# Patient Record
Sex: MALE | Race: WHITE | Employment: OTHER | URBAN - NONMETROPOLITAN AREA
[De-identification: names, ages, dates, MRNs, and addresses within clinical notes are randomized per-mention and may not be internally consistent; named-entity substitution may affect disease eponyms.]

---

## 2017-01-24 ENCOUNTER — CARE COORDINATION (OUTPATIENT)
Dept: CARE COORDINATION | Age: 82
End: 2017-01-24

## 2017-02-07 ENCOUNTER — TELEPHONE (OUTPATIENT)
Dept: FAMILY MEDICINE CLINIC | Age: 82
End: 2017-02-07

## 2017-02-07 DIAGNOSIS — I82.A11 DVT OF AXILLARY VEIN, ACUTE RIGHT (HCC): Primary | ICD-10-CM

## 2017-02-07 DIAGNOSIS — Z79.01 ANTICOAGULANT LONG-TERM USE: ICD-10-CM

## 2017-02-08 ENCOUNTER — TELEPHONE (OUTPATIENT)
Dept: FAMILY MEDICINE CLINIC | Age: 82
End: 2017-02-08

## 2017-02-08 RX ORDER — WARFARIN SODIUM 3 MG/1
3 TABLET ORAL DAILY
Qty: 90 TABLET | Refills: 1 | Status: SHIPPED | OUTPATIENT
Start: 2017-02-08 | End: 2017-04-24 | Stop reason: SDUPTHER

## 2017-02-22 ENCOUNTER — CARE COORDINATION (OUTPATIENT)
Dept: CARE COORDINATION | Age: 82
End: 2017-02-22

## 2017-03-13 ENCOUNTER — PROCEDURE VISIT (OUTPATIENT)
Dept: CARDIOLOGY | Age: 82
End: 2017-03-13

## 2017-03-13 ENCOUNTER — OFFICE VISIT (OUTPATIENT)
Dept: CARDIOLOGY | Age: 82
End: 2017-03-13

## 2017-03-13 VITALS — DIASTOLIC BLOOD PRESSURE: 60 MMHG | HEIGHT: 63 IN | HEART RATE: 60 BPM | SYSTOLIC BLOOD PRESSURE: 126 MMHG

## 2017-03-13 DIAGNOSIS — E78.01 FAMILIAL HYPERCHOLESTEROLEMIA: ICD-10-CM

## 2017-03-13 DIAGNOSIS — I10 ESSENTIAL HYPERTENSION: Primary | ICD-10-CM

## 2017-03-13 DIAGNOSIS — I25.10 CORONARY ARTERY DISEASE INVOLVING NATIVE CORONARY ARTERY OF NATIVE HEART WITHOUT ANGINA PECTORIS: ICD-10-CM

## 2017-03-13 DIAGNOSIS — Z95.0 PACEMAKER: Primary | ICD-10-CM

## 2017-03-13 DIAGNOSIS — E78.5 DYSLIPIDEMIA: ICD-10-CM

## 2017-03-13 DIAGNOSIS — Z95.0 PACEMAKER: ICD-10-CM

## 2017-03-13 DIAGNOSIS — I50.30 CHF WITH LEFT VENTRICULAR DIASTOLIC DYSFUNCTION, NYHA CLASS 2 (HCC): ICD-10-CM

## 2017-03-13 DIAGNOSIS — R06.02 SOB (SHORTNESS OF BREATH): ICD-10-CM

## 2017-03-13 PROCEDURE — 1123F ACP DISCUSS/DSCN MKR DOCD: CPT | Performed by: NUCLEAR MEDICINE

## 2017-03-13 PROCEDURE — 1036F TOBACCO NON-USER: CPT | Performed by: NUCLEAR MEDICINE

## 2017-03-13 PROCEDURE — 4040F PNEUMOC VAC/ADMIN/RCVD: CPT | Performed by: NUCLEAR MEDICINE

## 2017-03-13 PROCEDURE — G8484 FLU IMMUNIZE NO ADMIN: HCPCS | Performed by: NUCLEAR MEDICINE

## 2017-03-13 PROCEDURE — 99213 OFFICE O/P EST LOW 20 MIN: CPT | Performed by: NUCLEAR MEDICINE

## 2017-03-13 PROCEDURE — 93280 PM DEVICE PROGR EVAL DUAL: CPT | Performed by: INTERNAL MEDICINE

## 2017-03-13 PROCEDURE — G8598 ASA/ANTIPLAT THER USED: HCPCS | Performed by: NUCLEAR MEDICINE

## 2017-03-13 PROCEDURE — G8427 DOCREV CUR MEDS BY ELIG CLIN: HCPCS | Performed by: NUCLEAR MEDICINE

## 2017-03-13 PROCEDURE — G8420 CALC BMI NORM PARAMETERS: HCPCS | Performed by: NUCLEAR MEDICINE

## 2017-03-13 RX ORDER — FUROSEMIDE 40 MG/1
40 TABLET ORAL DAILY
Qty: 90 TABLET | Refills: 3 | Status: SHIPPED | OUTPATIENT
Start: 2017-03-13 | End: 2017-10-30 | Stop reason: SDUPTHER

## 2017-03-13 RX ORDER — SIMVASTATIN 80 MG
80 TABLET ORAL NIGHTLY
Qty: 30 TABLET | Refills: 11 | Status: SHIPPED | OUTPATIENT
Start: 2017-03-13 | End: 2017-10-30 | Stop reason: SDUPTHER

## 2017-03-13 RX ORDER — ISOSORBIDE DINITRATE 20 MG/1
20 TABLET ORAL 3 TIMES DAILY
Qty: 90 TABLET | Refills: 11 | Status: SHIPPED | OUTPATIENT
Start: 2017-03-13 | End: 2017-10-30 | Stop reason: SDUPTHER

## 2017-03-13 RX ORDER — POTASSIUM CHLORIDE 600 MG/1
8 TABLET, FILM COATED, EXTENDED RELEASE ORAL 2 TIMES DAILY
Qty: 180 TABLET | Refills: 3 | Status: SHIPPED | OUTPATIENT
Start: 2017-03-13 | End: 2017-04-24 | Stop reason: SDUPTHER

## 2017-03-23 ENCOUNTER — CARE COORDINATION (OUTPATIENT)
Dept: CARE COORDINATION | Age: 82
End: 2017-03-23

## 2017-04-24 ENCOUNTER — CARE COORDINATION (OUTPATIENT)
Dept: CARE COORDINATION | Age: 82
End: 2017-04-24

## 2017-04-24 ENCOUNTER — OFFICE VISIT (OUTPATIENT)
Dept: FAMILY MEDICINE CLINIC | Age: 82
End: 2017-04-24

## 2017-04-24 VITALS
BODY MASS INDEX: 26.68 KG/M2 | WEIGHT: 150.6 LBS | SYSTOLIC BLOOD PRESSURE: 118 MMHG | HEIGHT: 63 IN | DIASTOLIC BLOOD PRESSURE: 60 MMHG | HEART RATE: 64 BPM

## 2017-04-24 DIAGNOSIS — E78.2 MIXED HYPERLIPIDEMIA: ICD-10-CM

## 2017-04-24 DIAGNOSIS — M15.9 PRIMARY OSTEOARTHRITIS INVOLVING MULTIPLE JOINTS: ICD-10-CM

## 2017-04-24 DIAGNOSIS — J43.1 PANLOBULAR EMPHYSEMA (HCC): ICD-10-CM

## 2017-04-24 DIAGNOSIS — M48.061 LUMBAR SPINAL STENOSIS: Primary | ICD-10-CM

## 2017-04-24 DIAGNOSIS — D63.8 ANEMIA, CHRONIC DISEASE: ICD-10-CM

## 2017-04-24 DIAGNOSIS — I10 ESSENTIAL HYPERTENSION: ICD-10-CM

## 2017-04-24 PROCEDURE — G8420 CALC BMI NORM PARAMETERS: HCPCS | Performed by: FAMILY MEDICINE

## 2017-04-24 PROCEDURE — 99213 OFFICE O/P EST LOW 20 MIN: CPT | Performed by: FAMILY MEDICINE

## 2017-04-24 PROCEDURE — 3023F SPIROM DOC REV: CPT | Performed by: FAMILY MEDICINE

## 2017-04-24 PROCEDURE — G8427 DOCREV CUR MEDS BY ELIG CLIN: HCPCS | Performed by: FAMILY MEDICINE

## 2017-04-24 PROCEDURE — G8926 SPIRO NO PERF OR DOC: HCPCS | Performed by: FAMILY MEDICINE

## 2017-04-24 PROCEDURE — 1036F TOBACCO NON-USER: CPT | Performed by: FAMILY MEDICINE

## 2017-04-24 PROCEDURE — 1123F ACP DISCUSS/DSCN MKR DOCD: CPT | Performed by: FAMILY MEDICINE

## 2017-04-24 PROCEDURE — G8598 ASA/ANTIPLAT THER USED: HCPCS | Performed by: FAMILY MEDICINE

## 2017-04-24 PROCEDURE — 4040F PNEUMOC VAC/ADMIN/RCVD: CPT | Performed by: FAMILY MEDICINE

## 2017-04-24 RX ORDER — POTASSIUM CHLORIDE 600 MG/1
8 TABLET, FILM COATED, EXTENDED RELEASE ORAL 2 TIMES DAILY
Qty: 180 TABLET | Refills: 1 | Status: SHIPPED | OUTPATIENT
Start: 2017-04-24 | End: 2017-10-30 | Stop reason: SDUPTHER

## 2017-04-24 RX ORDER — WARFARIN SODIUM 3 MG/1
3 TABLET ORAL DAILY
Qty: 90 TABLET | Refills: 1 | Status: SHIPPED | OUTPATIENT
Start: 2017-04-24 | End: 2017-10-30 | Stop reason: SDUPTHER

## 2017-04-24 ASSESSMENT — PATIENT HEALTH QUESTIONNAIRE - PHQ9
2. FEELING DOWN, DEPRESSED OR HOPELESS: 0
SUM OF ALL RESPONSES TO PHQ9 QUESTIONS 1 & 2: 0
1. LITTLE INTEREST OR PLEASURE IN DOING THINGS: 0
SUM OF ALL RESPONSES TO PHQ QUESTIONS 1-9: 0

## 2017-05-26 ENCOUNTER — CARE COORDINATION (OUTPATIENT)
Dept: CARE COORDINATION | Age: 82
End: 2017-05-26

## 2017-06-30 ENCOUNTER — CARE COORDINATION (OUTPATIENT)
Dept: CARE COORDINATION | Age: 82
End: 2017-06-30

## 2017-07-27 ENCOUNTER — CARE COORDINATION (OUTPATIENT)
Dept: CARE COORDINATION | Age: 82
End: 2017-07-27

## 2017-07-27 ASSESSMENT — ENCOUNTER SYMPTOMS: DYSPNEA ASSOCIATED WITH: EXERTION

## 2017-08-11 ENCOUNTER — HOSPITAL ENCOUNTER (OUTPATIENT)
Age: 82
Discharge: HOME | End: 2017-08-11
Payer: MEDICARE

## 2017-08-11 LAB — INR BLD: 1.51 (ref 0.85–1.13)

## 2017-08-11 PROCEDURE — 36415 COLL VENOUS BLD VENIPUNCTURE: CPT

## 2017-08-11 PROCEDURE — 85610 PROTHROMBIN TIME: CPT

## 2017-09-05 ENCOUNTER — CARE COORDINATION (OUTPATIENT)
Dept: CARE COORDINATION | Age: 82
End: 2017-09-05

## 2017-09-05 ASSESSMENT — ENCOUNTER SYMPTOMS: DYSPNEA ASSOCIATED WITH: EXERTION

## 2017-09-08 ENCOUNTER — HOSPITAL ENCOUNTER (OUTPATIENT)
Age: 82
Discharge: HOME | End: 2017-09-08
Payer: MEDICARE

## 2017-09-08 LAB — INR BLD: 1.55 (ref 0.85–1.13)

## 2017-09-08 PROCEDURE — 85610 PROTHROMBIN TIME: CPT

## 2017-09-08 PROCEDURE — 36415 COLL VENOUS BLD VENIPUNCTURE: CPT

## 2017-09-30 ENCOUNTER — APPOINTMENT (OUTPATIENT)
Dept: GENERAL RADIOLOGY | Age: 82
End: 2017-09-30
Payer: MEDICARE

## 2017-09-30 ENCOUNTER — HOSPITAL ENCOUNTER (EMERGENCY)
Age: 82
Discharge: HOME | End: 2017-09-30
Attending: EMERGENCY MEDICINE
Payer: MEDICARE

## 2017-09-30 ENCOUNTER — APPOINTMENT (OUTPATIENT)
Dept: CT IMAGING | Age: 82
End: 2017-09-30
Payer: MEDICARE

## 2017-09-30 VITALS
HEART RATE: 70 BPM | OXYGEN SATURATION: 93 % | SYSTOLIC BLOOD PRESSURE: 145 MMHG | BODY MASS INDEX: 24.41 KG/M2 | HEIGHT: 64 IN | WEIGHT: 143 LBS | TEMPERATURE: 97.5 F | DIASTOLIC BLOOD PRESSURE: 90 MMHG | RESPIRATION RATE: 20 BRPM

## 2017-09-30 DIAGNOSIS — S09.90XA CLOSED HEAD INJURY, INITIAL ENCOUNTER: ICD-10-CM
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
## 2017-09-30 DIAGNOSIS — S01.01XA SCALP LACERATION, INITIAL ENCOUNTER: ICD-10-CM

## 2017-09-30 LAB
ALBUMIN SERPL-MCNC: 3.2 G/DL (ref 3.5–5.1)
ALP BLD-CCNC: 77 U/L (ref 38–126)
ALT SERPL-CCNC: 9 U/L (ref 11–66)
ANION GAP SERPL CALCULATED.3IONS-SCNC: 10 MEQ/L (ref 8–16)
ANISOCYTOSIS: ABNORMAL
APTT: 33.2 SECONDS (ref 22–38)
AST SERPL-CCNC: 20 U/L (ref 5–40)
BASOPHILS # BLD: 0.7 %
BASOPHILS ABSOLUTE: 0 THOU/MM3 (ref 0–0.1)
BILIRUB SERPL-MCNC: 0.3 MG/DL (ref 0.3–1.2)
BILIRUBIN DIRECT: < 0.2 MG/DL (ref 0–0.3)
BUN BLDV-MCNC: 44 MG/DL (ref 7–22)
CALCIUM SERPL-MCNC: 9.2 MG/DL (ref 8.5–10.5)
CHLORIDE BLD-SCNC: 100 MEQ/L (ref 98–111)
CO2: 29 MEQ/L (ref 23–33)
CREAT SERPL-MCNC: 1.7 MG/DL (ref 0.4–1.2)
EOSINOPHIL # BLD: 1.4 %
EOSINOPHILS ABSOLUTE: 0.1 THOU/MM3 (ref 0–0.4)
GFR SERPL CREATININE-BSD FRML MDRD: 38 ML/MIN/1.73M2
GLUCOSE BLD-MCNC: 113 MG/DL (ref 70–108)
HCT VFR BLD CALC: 35.9 % (ref 42–52)
HEMOGLOBIN: 12 GM/DL (ref 14–18)
INR BLD: 1.68 (ref 0.85–1.13)
LIPASE: 68.1 U/L (ref 5.6–51.3)
LYMPHOCYTES # BLD: 16.8 %
LYMPHOCYTES ABSOLUTE: 1.1 THOU/MM3 (ref 1–4.8)
MAGNESIUM: 2.4 MG/DL (ref 1.6–2.4)
MCH RBC QN AUTO: 30.1 PG (ref 27–31)
MCHC RBC AUTO-ENTMCNC: 33.5 GM/DL (ref 33–37)
MCV RBC AUTO: 89.9 FL (ref 80–94)
MONOCYTES # BLD: 8.5 %
MONOCYTES ABSOLUTE: 0.6 THOU/MM3 (ref 0.4–1.3)
NUCLEATED RED BLOOD CELLS: 0 /100 WBC
OSMOLALITY CALCULATION: 289.5 MOSMOL/KG (ref 275–300)
PDW BLD-RTO: 15 % (ref 11.5–14.5)
PLATELET # BLD: 180 THOU/MM3 (ref 130–400)
PMV BLD AUTO: 8.8 MCM (ref 7.4–10.4)
POTASSIUM SERPL-SCNC: 4.8 MEQ/L (ref 3.5–5.2)
PRO-BNP: 5845 PG/ML (ref 0–1800)
RBC # BLD: 3.99 MILL/MM3 (ref 4.7–6.1)
RBC # BLD: NORMAL 10*6/UL
SEG NEUTROPHILS: 72.6 %
SEGMENTED NEUTROPHILS ABSOLUTE COUNT: 4.7 THOU/MM3 (ref 1.8–7.7)
SODIUM BLD-SCNC: 139 MEQ/L (ref 135–145)
TOTAL PROTEIN: 6.5 G/DL (ref 6.1–8)
TROPONIN T: 0.03 NG/ML
TSH SERPL DL<=0.05 MIU/L-ACNC: 4.48 UIU/ML (ref 0.4–4.2)
WBC # BLD: 6.5 THOU/MM3 (ref 4.8–10.8)

## 2017-09-30 PROCEDURE — 72125 CT NECK SPINE W/O DYE: CPT

## 2017-09-30 PROCEDURE — 85730 THROMBOPLASTIN TIME PARTIAL: CPT

## 2017-09-30 PROCEDURE — 3209999900

## 2017-09-30 PROCEDURE — 12013 RPR F/E/E/N/L/M 2.6-5.0 CM: CPT

## 2017-09-30 PROCEDURE — 70450 CT HEAD/BRAIN W/O DYE: CPT

## 2017-09-30 PROCEDURE — 85025 COMPLETE CBC W/AUTO DIFF WBC: CPT

## 2017-09-30 PROCEDURE — 83690 ASSAY OF LIPASE: CPT

## 2017-09-30 PROCEDURE — 36415 COLL VENOUS BLD VENIPUNCTURE: CPT

## 2017-09-30 PROCEDURE — 85610 PROTHROMBIN TIME: CPT

## 2017-09-30 PROCEDURE — 93005 ELECTROCARDIOGRAM TRACING: CPT | Performed by: EMERGENCY MEDICINE

## 2017-09-30 PROCEDURE — 83880 ASSAY OF NATRIURETIC PEPTIDE: CPT

## 2017-09-30 PROCEDURE — 84484 ASSAY OF TROPONIN QUANT: CPT

## 2017-09-30 PROCEDURE — 74176 CT ABD & PELVIS W/O CONTRAST: CPT

## 2017-09-30 PROCEDURE — 76376 3D RENDER W/INTRP POSTPROCES: CPT

## 2017-09-30 PROCEDURE — 93005 ELECTROCARDIOGRAM TRACING: CPT

## 2017-09-30 PROCEDURE — 82248 BILIRUBIN DIRECT: CPT

## 2017-09-30 PROCEDURE — 84443 ASSAY THYROID STIM HORMONE: CPT

## 2017-09-30 PROCEDURE — 83735 ASSAY OF MAGNESIUM: CPT

## 2017-09-30 PROCEDURE — 99284 EMERGENCY DEPT VISIT MOD MDM: CPT

## 2017-09-30 PROCEDURE — 80053 COMPREHEN METABOLIC PANEL: CPT

## 2017-09-30 PROCEDURE — 71250 CT THORAX DX C-: CPT

## 2017-09-30 RX ORDER — LIDOCAINE HYDROCHLORIDE 10 MG/ML
5 INJECTION, SOLUTION INFILTRATION; PERINEURAL ONCE
Status: DISCONTINUED | OUTPATIENT
Start: 2017-09-30 | End: 2017-10-01 | Stop reason: HOSPADM

## 2017-09-30 ASSESSMENT — ENCOUNTER SYMPTOMS
SHORTNESS OF BREATH: 0
NAUSEA: 0
CHOKING: 0
SORE THROAT: 0
BLOOD IN STOOL: 0
TROUBLE SWALLOWING: 0
EYE DISCHARGE: 0
EYE PAIN: 0
CONSTIPATION: 0
BACK PAIN: 0
ABDOMINAL PAIN: 0
COUGH: 0
PHOTOPHOBIA: 0
EYE REDNESS: 0
RHINORRHEA: 0
CHEST TIGHTNESS: 0
VOMITING: 0
WHEEZING: 0
SINUS PRESSURE: 0
VOICE CHANGE: 0
EYE ITCHING: 0
ABDOMINAL DISTENTION: 0
DIARRHEA: 0

## 2017-09-30 ASSESSMENT — PAIN DESCRIPTION - DESCRIPTORS: DESCRIPTORS: SHARP

## 2017-09-30 ASSESSMENT — PAIN SCALES - GENERAL
PAINLEVEL_OUTOF10: 5
PAINLEVEL_OUTOF10: 5

## 2017-09-30 ASSESSMENT — PAIN DESCRIPTION - LOCATION: LOCATION: HEAD

## 2017-09-30 ASSESSMENT — PAIN DESCRIPTION - FREQUENCY: FREQUENCY: CONTINUOUS

## 2017-09-30 ASSESSMENT — PAIN DESCRIPTION - PAIN TYPE: TYPE: ACUTE PAIN

## 2017-09-30 NOTE — ED AVS SNAPSHOT
After Visit Summary  (Discharge Instructions)    Medication List for Home    Based on the information you provided to us as well as any changes during this visit, the following is your updated medication list.  Compare this with your prescription bottles at home. If you have any questions or concerns, contact your primary care physician's office. Daily Medication List (This medication list can be shared with any Healthcare provider who is helping you manage your medications)      ASK your doctor about these medications if you have questions     Acetaminophen 650 MG Tabs   Take 650 mg by mouth every 4 hours as needed       docusate sodium 250 MG capsule   Commonly known as:  COLACE   Take 250 mg by mouth daily.        furosemide 40 MG tablet   Commonly known as:  LASIX   Take 1 tablet by mouth daily One po q day prn leg edema       isosorbide dinitrate 20 MG tablet   Commonly known as:  ISORDIL   Take 1 tablet by mouth 3 times daily       metoprolol tartrate 25 MG tablet   Commonly known as:  LOPRESSOR   Take 1 tablet by mouth 2 times daily       potassium chloride 8 MEQ extended release tablet   Commonly known as:  KLOR-CON   Take 1 tablet by mouth 2 times daily       PRESERVISION AREDS Caps   Take by mouth       senna 8.6 MG tablet   Commonly known as:  SENOKOT   Take 1 tablet by mouth nightly       simvastatin 80 MG tablet   Commonly known as:  ZOCOR   Take 1 tablet by mouth nightly       traMADol 50 MG tablet   Commonly known as:  ULTRAM   Take 50 mg by mouth every 6 hours as needed for Pain       warfarin 3 MG tablet   Commonly known as:  COUMADIN   Take 1 tablet by mouth daily               Allergies as of 9/30/2017        Reactions    Potassium Chloride Er Other (See Comments)    Chokes on 20 meq tablets      Immunizations as of 9/30/2017     Name Date Dose VIS Date Route    Influenza Vaccine, unspecified formulation 10/17/2015 -- -- --    Comment: uploaded via claim file Influenza Whole 10/1/2012 -- -- --    Comment: [employer]    Pneumococcal Polysaccharide ([id number]) 2016 0.5 mL 2015 Intramuscular         After Visit Summary    This summary was created for you. Thank you for entrusting your care to us. The following information includes details about your hospital/visit stay along with steps you should take to help with your recovery once you leave the hospital.  In this packet, you will find information about the topics listed below:    · Instructions about your medications including a list of your home medications  · A summary of your hospital visit  · Follow-up appointments once you have left the hospital  · Your care plan at home      You may receive a survey regarding the care you received during your stay. Your input is valuable to us. We encourage you to complete and return your survey in the envelope provided. We hope you will choose us in the future for your healthcare needs. Patient Information     Patient Name MELANY Carney 1923      Care Provided at:     Name Address Phone       [address] [phone]            Your Visit    Here you will find information about your visit, including the reason for your visit. Please take this sheet with you when you visit your doctor or other health care provider in the future. It will help determine the best possible medical care for you at that time. If you have any questions once you leave the hospital, please call the department phone number listed below. Diagnoses this visit     Your diagnoses were FALL FROM SLIP, TRIP, OR STUMBLE, INITIAL ENCOUNTER, SCALP LACERATION, INITIAL ENCOUNTER, and CLOSED HEAD INJURY, INITIAL ENCOUNTER.       Visit Information     Date of Visit Department Dept Phone    2017 [address] [place] [address] [address] [address] EMERGENCY DEPT [phone]      You were seen by     You were seen by Kamla Murguia DO. Follow-up Appointments    Below is a list of your follow-up and future appointments. This may not be a complete list as you may have made appointments directly with providers that we are not aware of or your providers may have made some for you. Please call your providers to confirm appointments. It is important to keep your appointments. Please bring your current insurance card, photo ID, co-pay, and all medication bottles to your appointment. If self-pay, payment is expected at the time of service. Follow-up Information     Follow up with Haider Weston MD.    Specialty:  Family Medicine    Why:  stitches out in 5 days    Contact information:    [address] [facility]  [facility]  [phone]        Future Appointments     10/23/2017 3:15 PM     Appointment with Haidre Weston MD at [address] [place] [address] ([phone])   Please arrive 15 minutes prior to appointment, bring photo ID and insurance card. Please arrive 15 minutes prior to appointment, bring photo ID and insurance card. [address] [address]       3/12/2018 11:30 AM     Appointment with BERNARD Barron PACER NURSE at [facility].[place] ([phone])   [address] [place] [id number]       3/12/2018 11:45 AM     Appointment with Redd Yanes MD at [facility].[place] ([phone])   Please arrive 15 minutes prior to appointment time, bring insurance card and photo ID.   Please arrive 15 minutes prior to appointment time, bring insurance card and photo ID.   [address]  [address] [address]         Preventive Care        Date Due    Tetanus Combination Vaccine (1 - Tdap) 5/24/1942    Zoster Vaccine 5/24/1983    Pneumococcal Vaccines (two) for all adults aged 72 and over (2 of 2 - PCV13) 1/11/2017    Yearly Flu Vaccine (1) 9/1/2017                 Care Plan Once You Return Home    This section includes instructions you will need to follow once you leave the hospital.  Your care team will discuss these with you, so you and those caring for you know how to best care for your health needs at home. This section may also include educational information about certain health topics that may be of help to you. Important Information if you smoke or are exposed to smoking       SMOKING: QUIT SMOKING. THIS IS THE MOST IMPORTANT ACTION YOU CAN TAKE TO IMPROVE YOUR CURRENT AND FUTURE HEALTH. Call the [address] [place] [address] at [place] NOW ([phone])    Smoking harms nonsmokers. When nonsmokers are around people who smoke, they absorb nicotine, carbon monoxide, and other ingredients of tobacco smoke. DO NOT SMOKE AROUND CHILDREN     Children exposed to secondhand smoke are at an increased risk of:  Sudden Infant Death Syndrome (SIDS), acute respiratory infections, inflammation of the middle ear, and severe asthma. Over a longer time, it causes heart disease and lung cancer. There is no safe level of exposure to secondhand smoke. Important information for a smoker       SMOKING: QUIT SMOKING. THIS IS THE MOST IMPORTANT ACTION YOU CAN TAKE TO IMPROVE YOUR CURRENT AND FUTURE HEALTH. Call the [address] [place] [address] at [place] NOW ([phone])    Smoking harms nonsmokers. When nonsmokers are around people who smoke, they absorb nicotine, carbon monoxide, and other ingredients of tobacco smoke. DO NOT SMOKE AROUND CHILDREN     Children exposed to secondhand smoke are at an increased risk of:  Sudden Infant Death Syndrome (SIDS), acute respiratory infections, inflammation of the middle ear, and severe asthma. Over a longer time, it causes heart disease and lung cancer. There is no safe level of exposure to secondhand smoke. Bills Khakishart Signup     Our records indicate that you have an active [employer] account.     You can view your After Visit Summary by going to

## 2017-10-01 NOTE — ED NOTES
Reassessment of the patients Fall   is unchanged, the patients pain reassessment is a 5/10, Side rails up times 2, call light in reach, will continue to monitor. Pt.'s family at bedside.       Alyssa Soares RN  09/30/17 1099

## 2017-10-01 NOTE — ED PROVIDER NOTES
1 tablet by mouth 3 times daily    METOPROLOL TARTRATE (LOPRESSOR) 25 MG TABLET    Take 1 tablet by mouth 2 times daily    MULTIPLE VITAMINS-MINERALS (PRESERVISION AREDS) CAPS    Take by mouth    POTASSIUM CHLORIDE (KLOR-CON) 8 MEQ EXTENDED RELEASE TABLET    Take 1 tablet by mouth 2 times daily    SENNA (SENOKOT) 8.6 MG TABLET    Take 1 tablet by mouth nightly    SIMVASTATIN (ZOCOR) 80 MG TABLET    Take 1 tablet by mouth nightly    TRAMADOL (ULTRAM) 50 MG TABLET    Take 50 mg by mouth every 6 hours as needed for Pain    WARFARIN (COUMADIN) 3 MG TABLET    Take 1 tablet by mouth daily       ALLERGIES     is allergic to potassium chloride er. FAMILY HISTORY     indicated that his mother is . He indicated that his father is . He indicated that the status of his sister is unknown. He indicated that his brother is . He indicated that the status of his paternal aunt is unknown.  family history includes Arthritis in his paternal aunt; Cancer in his brother and sister; Early Death in his mother; Heart Disease in his brother and father; Other in his paternal aunt. SOCIAL HISTORY      reports that he quit smoking about 24 years ago. His smoking use included Cigarettes. He has quit using smokeless tobacco. He reports that he does not drink alcohol or use illicit drugs. PHYSICAL EXAM     INITIAL VITALS:  height is 5' 4\" (1.626 m) and weight is 143 lb (64.9 kg). His oral temperature is 97.5 °F (36.4 °C). His blood pressure is 127/96 (abnormal) and his pulse is 75. His respiration is 20 and oxygen saturation is 92%. Physical Exam   Constitutional: He is oriented to person, place, and time. He appears well-developed and well-nourished. He is active and cooperative. Non-toxic appearance. No distress. HENT:   Head: Normocephalic. Head is with laceration (1 cm on mid forehead). Head is without Chappell's sign, without right periorbital erythema and without left periorbital erythema.    Right Ear: Normal.        Left hip: Normal.        Right knee: Normal.        Left knee: Normal.        Cervical back: Normal.        Thoracic back: Normal.        Lumbar back: Normal.   Good strength appreciated in all muscle groups. Moving all four extremities without difficulty. Lymphadenopathy:     He has no cervical adenopathy. Neurological: He is alert and oriented to person, place, and time. He has normal strength and normal reflexes. No cranial nerve deficit or sensory deficit. He exhibits normal muscle tone. Coordination and gait normal. GCS eye subscore is 4. GCS verbal subscore is 5. GCS motor subscore is 6. Cranial nerves II-XII intact   Skin: Skin is warm, dry and intact. No bruising, no ecchymosis and no rash noted. He is not diaphoretic. No pallor. Psychiatric: He has a normal mood and affect. His speech is normal and behavior is normal. Judgment and thought content normal. Cognition and memory are normal.   Nursing note and vitals reviewed. DIFFERENTIAL DIAGNOSIS:   Differential diagnoses were discussed extensively with the patient and family including but not limited to: Closed head injury scalp laceration intracranial injury cervical strain cervical fracture, chest wall injury, intrathoracic injury, intra-abdominal injury, thoracic fracture lumbar fracture pelvic fracture. DIAGNOSTIC RESULTS     EKG: All EKG's are interpreted by the Emergency Department Physician who either signs or Co-signs this chart in the absence of a cardiologist.  EKG interpreted by Lissette Elise DO:    EKG read and interpreted by myself gives impression of atrial fibrillation with occasional ventricular paced complexes with heart rate of 70; ;QTc 494; axis -78; No STEMI; left axis deviation, RBBB, septal infarct     RADIOLOGY: non-plain film images(s) such as CT, Ultrasound and MRI are read by the radiologist.  CT ABDOMEN PELVIS WO IV CONTRAST Additional Contrast? None   Final Result    CT chest:   1.  Small to moderate size right pleural effusion with overlying atelectasis. 2. No acute finding. 3. Mild to moderate cardiomegaly. CT abdomen pelvis:   1. No acute findings. 2. Diverticulosis. 3. Simple appearing renal cysts. 4. No fractures. **This report has been created using voice recognition software. It may contain minor errors which are inherent in voice recognition technology. **      Final report electronically signed by Dr. Brendan Schulte on 9/30/2017 9:58 PM      CT CHEST WO CONTRAST   Final Result    CT chest:   1. Small to moderate size right pleural effusion with overlying atelectasis. 2. No acute finding. 3. Mild to moderate cardiomegaly. CT abdomen pelvis:   1. No acute findings. 2. Diverticulosis. 3. Simple appearing renal cysts. 4. No fractures. **This report has been created using voice recognition software. It may contain minor errors which are inherent in voice recognition technology. **      Final report electronically signed by Dr. Brendan Schulte on 9/30/2017 9:58 PM      CT CERVICAL SPINE WO CONTRAST   Final Result   1. No acute fracture or acute subluxation. 2.  Moderate to severe degenerative changes of the cervical spine mostly discogenic with loss of disc material. The mild subluxations present are chronic and unchanged from March 21, 2013. 3.  No acute soft tissue abnormalities. **This report has been created using voice recognition software. It may contain minor errors which are inherent in voice recognition technology. **      Final report electronically signed by Dr. Brendan Schulte on 9/30/2017 9:53 PM      CT HEAD WO CONTRAST   Final Result   1. There is no acute intracranial hemorrhage, infarction, or mass. 2.  Small right frontal scalp hematoma. 3.  Severe chronic small vessel white matter ischemic changes. 4.  Two left mastoid air cells inferiorly which are opacified.                **This report has been created using voice recognition software. It may contain minor errors which are inherent in voice recognition technology. **      Final report electronically signed by Dr. Kleber Espino on 9/30/2017 9:46 PM      US Ed Fast Abdomen Limited   Final Result      CT Lumbar Reconstruction WO Post Process    (Results Pending)   CT Thoracic Reconstruction WO Post Process    (Results Pending)       LABS:   Labs Reviewed   PROTIME-INR - Abnormal; Notable for the following:        Result Value    INR 1.68 (*)     All other components within normal limits   BRAIN NATRIURETIC PEPTIDE - Abnormal; Notable for the following:     Pro-BNP 5845.0 (*)     All other components within normal limits   CBC WITH AUTO DIFFERENTIAL - Abnormal; Notable for the following:     RBC 3.99 (*)     Hemoglobin 12.0 (*)     Hematocrit 35.9 (*)     RDW 15.0 (*)     All other components within normal limits   BASIC METABOLIC PANEL - Abnormal; Notable for the following:     Glucose 113 (*)     BUN 44 (*)     CREATININE 1.7 (*)     All other components within normal limits   HEPATIC FUNCTION PANEL - Abnormal; Notable for the following:     Alb 3.2 (*)     ALT 9 (*)     All other components within normal limits   LIPASE - Abnormal; Notable for the following:     Lipase 68.1 (*)     All other components within normal limits   TROPONIN - Abnormal; Notable for the following:     Troponin T 0.029 (*)     All other components within normal limits   TSH WITHOUT REFLEX - Abnormal; Notable for the following:     TSH 4.480 (*)     All other components within normal limits   GLOMERULAR FILTRATION RATE, ESTIMATED - Abnormal; Notable for the following:     Est, Glom Filt Rate 38 (*)     All other components within normal limits   APTT   MAGNESIUM   ANION GAP   OSMOLALITY   URINE RT REFLEX TO CULTURE       EMERGENCY DEPARTMENT COURSE:   Vitals:    Vitals:    09/30/17 2008 09/30/17 2115   BP: (!) 136/102 (!) 127/96   Pulse: 80 75   Resp: 22 20   Temp: 97.5 °F (36.4 °C)    TempSrc: Oral

## 2017-10-02 ENCOUNTER — CARE COORDINATION (OUTPATIENT)
Dept: FAMILY MEDICINE CLINIC | Age: 82
End: 2017-10-02

## 2017-10-03 LAB
EKG Q-T INTERVAL: 458 MS
EKG QRS DURATION: 154 MS
EKG QTC CALCULATION (BAZETT): 495 MS
EKG R AXIS: -78 DEGREES
EKG T AXIS: 28 DEGREES
EKG VENTRICULAR RATE: 70 BPM

## 2017-10-05 ENCOUNTER — OFFICE VISIT (OUTPATIENT)
Dept: FAMILY MEDICINE CLINIC | Age: 82
End: 2017-10-05
Payer: MEDICARE

## 2017-10-05 VITALS
SYSTOLIC BLOOD PRESSURE: 92 MMHG | HEART RATE: 62 BPM | HEIGHT: 64 IN | DIASTOLIC BLOOD PRESSURE: 58 MMHG | WEIGHT: 148 LBS | BODY MASS INDEX: 25.27 KG/M2

## 2017-10-05 DIAGNOSIS — S09.90XA: Primary | ICD-10-CM

## 2017-10-05 DIAGNOSIS — I95.2 HYPOTENSION DUE TO DRUGS: ICD-10-CM

## 2017-10-05 PROCEDURE — 1123F ACP DISCUSS/DSCN MKR DOCD: CPT | Performed by: FAMILY MEDICINE

## 2017-10-05 PROCEDURE — 99213 OFFICE O/P EST LOW 20 MIN: CPT | Performed by: FAMILY MEDICINE

## 2017-10-05 PROCEDURE — G8427 DOCREV CUR MEDS BY ELIG CLIN: HCPCS | Performed by: FAMILY MEDICINE

## 2017-10-05 PROCEDURE — G8417 CALC BMI ABV UP PARAM F/U: HCPCS | Performed by: FAMILY MEDICINE

## 2017-10-05 PROCEDURE — 4040F PNEUMOC VAC/ADMIN/RCVD: CPT | Performed by: FAMILY MEDICINE

## 2017-10-05 PROCEDURE — 1036F TOBACCO NON-USER: CPT | Performed by: FAMILY MEDICINE

## 2017-10-05 PROCEDURE — G8598 ASA/ANTIPLAT THER USED: HCPCS | Performed by: FAMILY MEDICINE

## 2017-10-05 PROCEDURE — G8484 FLU IMMUNIZE NO ADMIN: HCPCS | Performed by: FAMILY MEDICINE

## 2017-10-05 ASSESSMENT — ENCOUNTER SYMPTOMS
WHEEZING: 0
SHORTNESS OF BREATH: 0

## 2017-10-05 NOTE — MR AVS SNAPSHOT
4\" (1.626 m) 148 lb (67.1 kg) 25.4 kg/m2 Former Smoker      Additional Information about your Body Mass Index (BMI)           Your BMI as listed above is considered overweight (25.0-29.9). BMI is an estimate of body fat, calculated from your height and weight. The higher your BMI, the greater your risk of heart disease, high blood pressure, type 2 diabetes, stroke, gallstones, arthritis, sleep apnea, and certain cancers. BMI is not perfect. It may overestimate body fat in athletes and people who are more muscular. If your body fat is high you can improve your BMI by decreasing your calorie intake and becoming more physically active. Learn more at: [email or web address]             Medications and Orders      Your Current Medications Are              potassium chloride (KLOR-CON) 8 MEQ extended release tablet Take 1 tablet by mouth 2 times daily    warfarin (COUMADIN) 3 MG tablet Take 1 tablet by mouth daily    furosemide (LASIX) 40 MG tablet Take 1 tablet by mouth daily One po q day prn leg edema    isosorbide dinitrate (ISORDIL) 20 MG tablet Take 1 tablet by mouth 3 times daily    metoprolol tartrate (LOPRESSOR) 25 MG tablet Take 1 tablet by mouth 2 times daily    simvastatin (ZOCOR) 80 MG tablet Take 1 tablet by mouth nightly    traMADol (ULTRAM) 50 MG tablet Take 50 mg by mouth every 6 hours as needed for Pain    Multiple Vitamins-Minerals (PRESERVISION AREDS) CAPS Take by mouth    acetaminophen 650 MG TABS Take 650 mg by mouth every 4 hours as needed    senna (SENOKOT) 8.6 MG tablet Take 1 tablet by mouth nightly    docusate sodium (COLACE) 250 MG capsule Take 250 mg by mouth daily.         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)    Blepharitis of right eye (Chronic)    Osteoarthritis (Chronic)    Lumbar spinal stenosis (Chronic)    COPD (chronic obstructive pulmonary disease) (HCC) (Chronic)    Aspiration into airway    CHF with left ventricular diastolic dysfunction, NYHA class 2 (HCC)    HTN (hypertension)    CKD (chronic kidney disease)    CKD (chronic kidney disease) stage 3, GFR 30-59 ml/min    Atrial flutter, paroxysmal ([address]    [employer] dual pacer    CAD (coronary artery disease)    Dyslipidemia      Your Goals as of 10/5/2017 at 3:10 PM              9/5/17       Care Coordination    Conditions and Symptoms   On track    Notes    I will schedule office visits, as directed by my provider. I will keep my appointment or reschedule if I have to cancel. I will notify my provider of any barriers to my plan of care. I will follow my Zone Management tool to seek urgent or emergent care. I will notify my provider of any symptoms that indicate a worsening of my condition.     Barriers: none  Plan for overcoming my barriers: N/A  Confidence: 7/10  Anticipated Goal Completion Date: 7/26/17        Nutrition Plan   No change    Notes    I will follow a nutritional plan as directed   Low Sodium:  2g    Barriers: none  Plan for overcoming my barriers: N/A  Confidence: 7/10  Anticipated Goal Completion Date: 7/26/17        Immunizations as of 10/5/2017     Name Date    Influenza Vaccine, unspecified formulation 10/17/2015    Influenza Whole 10/1/2012    Pneumococcal Polysaccharide ([id number]) 1/11/2016      Preventive Care        Date Due    Tetanus Combination Vaccine (1 - Tdap) 5/24/1942    Zoster Vaccine 5/24/1983 Pneumococcal Vaccines (two) for all adults aged 72 and over (2 of 2 - PCV13) 1/11/2017    Yearly Flu Vaccine (1) 9/1/2017            MyChart Signup           Our records indicate that you have an active [employer] account. You can view your After Visit Summary by going to [email or web address] and logging in with your [employer] username and password. If you don't have a [employer] username and password but a parent or guardian has access to your record, the parent or guardian should login with their own [employer] username and password and access your record to view the After Visit Summary. Additional Information  If you have questions, please contact the physician practice where you receive care. Remember, [employer] is NOT to be used for urgent needs. For medical emergencies, dial 911. For questions regarding your [employer] account call [phone]. If you have a clinical question, please call your doctor's office.

## 2017-10-05 NOTE — PROGRESS NOTES
[facility] PROFESSIONAL SERVS  [facility]  [address] [address]  Dept: [phone]  Dept Fax: [phone]  Loc: [phone]  PROGRESS NOTE      Visit Date: 10/5/2017    Riley Ricks is a 80 y.o. male who presents today for:  Chief Complaint   Patient presents with    Follow-up     ER F/U  hit head on concrete floor  on Saturday  has 8 stitches in forehead       Subjective:  HPI    ER f/u for wound on forehead. 8 stitches present. No fevers or chills. No headaches. Low bp at home. No symptoms. No chest pain    Review of Systems   Constitutional: Negative for chills and fever. Respiratory: Negative for shortness of breath and wheezing. Cardiovascular: Negative for chest pain and leg swelling. Skin: Positive for wound. Negative for rash. Neurological: Negative for dizziness and syncope.      Past Medical History:   Diagnosis Date    Arthritis     Atrial flutter, paroxysmal (HCC)     BPH (benign prostatic hyperplasia)     CAD (coronary artery disease)     Status post bypass    Cancer ([address]     CHF (congestive heart failure) (HCC)     Chronic kidney disease     Dyslipidemia     Hyperlipidemia     Movement disorder     Sick sinus syndrome ([address]     pace maker    Small bowel obstruction       Past Surgical History:   Procedure Laterality Date    CHOLECYSTECTOMY  11/15/2004    Dr Arlette Lr  9/04/2009    Dr Dyllan Emerson    ENDOSCOPY, COLON, DIAGNOSTIC      EYE SURGERY      JOINT REPLACEMENT      both hips    PACEMAKER INSERTION  2012    SKIN CANCER EXCISION      UPPER GASTROINTESTINAL ENDOSCOPY  10/05/2007    Dr Siri Mcmullen     Family History   Problem Relation Age of Onset    Early Death Mother     Heart Disease Brother     Cancer Brother     Heart Disease Father     Cancer Sister     Arthritis Paternal Aunt     Other Paternal Aunt      Social History   Substance Use Topics    Smoking sounds normal. No respiratory distress. Neurological: He is alert and oriented to person, place, and time. Skin:   Forehead:  T shaped incision intact and without erythema or drainage. 8 sutures present. Scab present   Psychiatric: He has a normal mood and affect. His behavior is normal.   Vitals reviewed. CT head:  9/30/17. Negative for intracranial hemorrhage. Impression/Plan:  1. Closed wound of head, initial encounter  Well healing. 8 sutures were removed in office today. 2. Hypotension due to drugs  -unclear etiology. Decrease metoprolol to 12.5 mg twice daily until BP improves. They voiced understanding. All questions answered. They agreed with treatment plan. Return if symptoms worsen or fail to improve.        Electronically signed by Brayden Carballo MD on 10/5/2017 at 2:57 PM

## 2017-10-09 ENCOUNTER — CARE COORDINATION (OUTPATIENT)
Dept: CARE COORDINATION | Age: 82
End: 2017-10-09

## 2017-10-09 NOTE — CARE COORDINATION
Ambulatory Care Coordination Note  10/9/2017  CM Risk Score: 4  Marcel Mortality Risk Score: 38.11    ACC: Mike Tapia, RN    Summary Note: Spoke with wife, Davion Medina. States Lewis Pappas is feeling better since fall. Saw Dr. Godwin Marie last week. Metoprolol dose was lowered to 12.5 from 25 mg. In office BP was 92/58. Verified medication changes and list with Anjelica. States she hasn't checked his BP since med change. Discussed home BP monitoring machine available at pharmacy or can stop in office for nurse to check BP. Using walker at all times. Denies weight gain, ankle swelling, or changes in breathing. Takes Lasix daily. Discussed early symptom reporting including swelling, weight gain, changes in breathing.       Congestive Heart Failure Assessment    Are you currently restricting fluids?:  No Restriction  Do you understand a low sodium diet?:  Yes  Do you understand how to read food labels?:  Yes  How many restaurant meals do you eat per week?:  1-2  Do you salt your food before tasting it?:  No     No patient-reported symptoms      Symptoms:   None:  Yes      Symptom course:  stable  Weight trend:  stable  Salt intake watch compared to last visit:  stable      and   COPD Assessment    Does the patient understand envrionmental exposure?:  Yes  Is the patient able to verbalize Rescue vs. Long Acting medications?:  No  Does the patient have a nebulizer?:  No  Does the patient use a space with inhaled medications?:  No     No patient-reported symptoms         Symptoms:   None:  Yes      Symptom course:  stable  Breathlessness:  none  Increase use of rapid acting/rescue inhaled medications?:  No  Change in chronic cough?:  No/At Baseline  Change in sputum?:  No/At Baseline  Have you had a recent diagnosis of pneumonia either by PCP or at a hospital?:  No               Care Coordination Interventions    Program Enrollment:  Rising Risk  Referral from Primary Care Provider:  Yes  Suggested Interventions and Take by mouth   Yes Historical Provider, MD   acetaminophen 650 MG TABS Take 650 mg by mouth every 4 hours as needed 1/13/16  Yes Candancealexi Bowling MD   senna (SENOKOT) 8.6 MG tablet Take 1 tablet by mouth nightly   Yes Historical Provider, MD   docusate sodium (COLACE) 250 MG capsule Take 250 mg by mouth daily.      Yes Historical Provider, MD       Future Appointments  Date Time Provider Steven Bull   10/23/2017 3:15 PM Amber Fung MD [facility]   3/12/2018 11:30 AM SCHEDULE, SRPS PACER NURSE [facility] PACER [facility] [place] [address]   3/12/2018 11:45 AM Jair Quezada MD [facility] [place]

## 2017-10-30 ENCOUNTER — HOSPITAL ENCOUNTER (OUTPATIENT)
Age: 82
Discharge: HOME | End: 2017-10-30
Payer: MEDICARE

## 2017-10-30 ENCOUNTER — OFFICE VISIT (OUTPATIENT)
Dept: FAMILY MEDICINE CLINIC | Age: 82
End: 2017-10-30
Payer: MEDICARE

## 2017-10-30 VITALS
HEART RATE: 64 BPM | SYSTOLIC BLOOD PRESSURE: 100 MMHG | BODY MASS INDEX: 25.27 KG/M2 | WEIGHT: 148 LBS | HEIGHT: 64 IN | DIASTOLIC BLOOD PRESSURE: 70 MMHG

## 2017-10-30 DIAGNOSIS — R60.0 LEG EDEMA: ICD-10-CM

## 2017-10-30 DIAGNOSIS — I10 ESSENTIAL HYPERTENSION: Primary | ICD-10-CM

## 2017-10-30 DIAGNOSIS — Z91.81 AT HIGH RISK FOR FALLS: ICD-10-CM

## 2017-10-30 DIAGNOSIS — I48.92 ATRIAL FLUTTER, PAROXYSMAL (HCC): ICD-10-CM

## 2017-10-30 DIAGNOSIS — Z23 INFLUENZA VACCINE NEEDED: ICD-10-CM

## 2017-10-30 DIAGNOSIS — Z79.01 ANTICOAGULANT LONG-TERM USE: ICD-10-CM

## 2017-10-30 DIAGNOSIS — E78.5 DYSLIPIDEMIA: ICD-10-CM

## 2017-10-30 DIAGNOSIS — I25.10 CORONARY ARTERY DISEASE INVOLVING NATIVE CORONARY ARTERY OF NATIVE HEART WITHOUT ANGINA PECTORIS: ICD-10-CM

## 2017-10-30 DIAGNOSIS — N18.30 CKD (CHRONIC KIDNEY DISEASE) STAGE 3, GFR 30-59 ML/MIN (HCC): ICD-10-CM

## 2017-10-30 DIAGNOSIS — I82.A11 DVT OF AXILLARY VEIN, ACUTE RIGHT (HCC): ICD-10-CM

## 2017-10-30 LAB — INR BLD: 1.6 (ref 0.85–1.13)

## 2017-10-30 PROCEDURE — 99214 OFFICE O/P EST MOD 30 MIN: CPT | Performed by: FAMILY MEDICINE

## 2017-10-30 PROCEDURE — 1123F ACP DISCUSS/DSCN MKR DOCD: CPT | Performed by: FAMILY MEDICINE

## 2017-10-30 PROCEDURE — G8427 DOCREV CUR MEDS BY ELIG CLIN: HCPCS | Performed by: FAMILY MEDICINE

## 2017-10-30 PROCEDURE — 85610 PROTHROMBIN TIME: CPT

## 2017-10-30 PROCEDURE — G8484 FLU IMMUNIZE NO ADMIN: HCPCS | Performed by: FAMILY MEDICINE

## 2017-10-30 PROCEDURE — G8598 ASA/ANTIPLAT THER USED: HCPCS | Performed by: FAMILY MEDICINE

## 2017-10-30 PROCEDURE — 36415 COLL VENOUS BLD VENIPUNCTURE: CPT

## 2017-10-30 PROCEDURE — G8417 CALC BMI ABV UP PARAM F/U: HCPCS | Performed by: FAMILY MEDICINE

## 2017-10-30 PROCEDURE — 90688 IIV4 VACCINE SPLT 0.5 ML IM: CPT | Performed by: FAMILY MEDICINE

## 2017-10-30 PROCEDURE — 4040F PNEUMOC VAC/ADMIN/RCVD: CPT | Performed by: FAMILY MEDICINE

## 2017-10-30 PROCEDURE — G0008 ADMIN INFLUENZA VIRUS VAC: HCPCS | Performed by: FAMILY MEDICINE

## 2017-10-30 PROCEDURE — 1036F TOBACCO NON-USER: CPT | Performed by: FAMILY MEDICINE

## 2017-10-30 RX ORDER — SIMVASTATIN 80 MG
80 TABLET ORAL NIGHTLY
Qty: 30 TABLET | Refills: 5 | Status: SHIPPED | OUTPATIENT
Start: 2017-10-30 | End: 2018-01-10

## 2017-10-30 RX ORDER — WARFARIN SODIUM 3 MG/1
3 TABLET ORAL DAILY
Qty: 90 TABLET | Refills: 1 | Status: SHIPPED | OUTPATIENT
Start: 2017-10-30 | End: 2018-04-30 | Stop reason: SDUPTHER

## 2017-10-30 RX ORDER — ISOSORBIDE DINITRATE 10 MG/1
10 TABLET ORAL 3 TIMES DAILY
Qty: 90 TABLET | Refills: 5 | Status: SHIPPED | OUTPATIENT
Start: 2017-10-30 | End: 2018-04-30 | Stop reason: SDUPTHER

## 2017-10-30 RX ORDER — FUROSEMIDE 40 MG/1
40 TABLET ORAL DAILY
Qty: 30 TABLET | Refills: 5 | Status: SHIPPED | OUTPATIENT
Start: 2017-10-30 | End: 2018-04-30 | Stop reason: SDUPTHER

## 2017-10-30 RX ORDER — POTASSIUM CHLORIDE 600 MG/1
8 TABLET, FILM COATED, EXTENDED RELEASE ORAL 2 TIMES DAILY
Qty: 60 TABLET | Refills: 5 | Status: SHIPPED | OUTPATIENT
Start: 2017-10-30 | End: 2018-04-30 | Stop reason: SDUPTHER

## 2017-10-30 ASSESSMENT — ENCOUNTER SYMPTOMS
WHEEZING: 0
SHORTNESS OF BREATH: 0

## 2017-10-30 NOTE — PROGRESS NOTES
Didi Orozco comes in for nurse visit for seasonal flu vaccine. Administered by Elisa Castro. Patient tolerated well. Advised to call office with any delayed reaction. LEFT ARM

## 2017-10-30 NOTE — PROGRESS NOTES
[facility] PROFESSIONAL SERVS  [facility]  [address] [address]  Dept: [phone]  Dept Fax: [phone]  Loc: [phone]  PROGRESS NOTE      Visit Date: 10/30/2017    Tato Galloway is a 80 y.o. male who presents today for:  Chief Complaint   Patient presents with    6 Month Follow-Up    Hypertension       Subjective:  HPI    6 month f/u    HTN:  On lopressor and isordil. Some low BPs. Sometimes has dizziness. CAD:  On lopressor. On statin. No chest pain. On daily nitrates. Hyperlipidemia:  On zocor. No muscle aches    Doing well except for a fall last month. Has a walker. Wife and daughter are present. Review of Systems   Constitutional: Negative for chills and fever. Respiratory: Negative for shortness of breath and wheezing. Cardiovascular: Negative for chest pain and leg swelling.      Past Medical History:   Diagnosis Date    Arthritis     Atrial flutter, paroxysmal (HCC)     BPH (benign prostatic hyperplasia)     CAD (coronary artery disease)     Status post bypass    Cancer ([address]     CHF (congestive heart failure) (HCC)     Chronic kidney disease     Dyslipidemia     Hyperlipidemia     Movement disorder     Sick sinus syndrome ([address]     pace maker    Small bowel obstruction       Past Surgical History:   Procedure Laterality Date    CHOLECYSTECTOMY  11/15/2004    Dr Elvia Avendaoñ  9/04/2009    Dr Gwendolyn Colvin    ENDOSCOPY, COLON, DIAGNOSTIC      EYE SURGERY      JOINT REPLACEMENT      both hips    PACEMAKER INSERTION  2012    SKIN CANCER EXCISION      UPPER GASTROINTESTINAL ENDOSCOPY  10/05/2007    Dr Elke Guerin     Family History   Problem Relation Age of Onset    Early Death Mother     Heart Disease Brother     Cancer Brother     Heart Disease Father     Cancer Sister     Arthritis Paternal Aunt     Other Paternal Aunt      Social History   Substance Use Topics    Smoking status: Former Smoker     Types: Cigarettes     Quit date: 4/1/1993    Smokeless tobacco: Former User    Alcohol use No      Current Outpatient Prescriptions   Medication Sig Dispense Refill    Acetaminophen (TYLENOL ARTHRITIS PAIN PO) Take by mouth      potassium chloride (KLOR-CON) 8 MEQ extended release tablet Take 1 tablet by mouth 2 times daily 180 tablet 1    warfarin (COUMADIN) 3 MG tablet Take 1 tablet by mouth daily 90 tablet 1    furosemide (LASIX) 40 MG tablet Take 1 tablet by mouth daily One po q day prn leg edema 90 tablet 3    isosorbide dinitrate (ISORDIL) 20 MG tablet Take 1 tablet by mouth 3 times daily 90 tablet 11    metoprolol tartrate (LOPRESSOR) 25 MG tablet Take 1 tablet by mouth 2 times daily (Patient taking differently: Take 12.5 mg by mouth 2 times daily ) 60 tablet 11    simvastatin (ZOCOR) 80 MG tablet Take 1 tablet by mouth nightly 30 tablet 11    Multiple Vitamins-Minerals (PRESERVISION AREDS) CAPS Take by mouth      senna (SENOKOT) 8.6 MG tablet Take 1 tablet by mouth nightly      docusate sodium (COLACE) 250 MG capsule Take 250 mg by mouth daily. No current facility-administered medications for this visit. Allergies   Allergen Reactions    Potassium Chloride Er Other (See Comments)     Chokes on 20 meq tablets     Health Maintenance   Topic Date Due    DTaP/Tdap/Td vaccine (1 - Tdap) 05/24/1942    Zostavax vaccine  05/24/1983    Pneumococcal low/med risk (2 of 2 - PCV13) 01/11/2017    Flu vaccine (1) 09/01/2017       Objective:     /70 (Site: Left Arm, Position: Sitting, Cuff Size: Large Adult)   Pulse 64 Comment: slight irregular  Ht 5' 4\" (1.626 m)   Wt 148 lb (67.1 kg)   BMI 25.40 kg/m²   Physical Exam   Constitutional: He is oriented to person, place, and time. He appears well-developed and well-nourished. No distress. Cardiovascular: Normal rate. An irregular rhythm present.    Pulmonary/Chest: Effort normal and breath sounds normal. No respiratory distress. He has no wheezes. Musculoskeletal: He exhibits no edema. Neurological: He is alert and oriented to person, place, and time. Psychiatric: He has a normal mood and affect. His behavior is normal.   Vitals reviewed. Lab Results   Component Value Date    WBC 6.5 09/30/2017    HGB 12.0 (L) 09/30/2017    HCT 35.9 (L) 09/30/2017     09/30/2017    CHOL 133 04/24/2017    TRIG 59 04/24/2017    HDL 60 04/24/2017    LDLDIRECT 87.00 09/04/2013    ALT 9 (L) 09/30/2017    AST 20 09/30/2017     09/30/2017    K 4.8 09/30/2017     09/30/2017    CREATININE 1.7 (H) 09/30/2017    BUN 44 (H) 09/30/2017    CO2 29 09/30/2017    TSH 4.480 (H) 09/30/2017    INR 1.68 (H) 09/30/2017    LABMICR 1.58 03/21/2014         Impression/Plan:  1. Essential hypertension  Over-treated. -decrease isordil to 10 mg from 20 mg 3x per day    2. Coronary artery disease involving native coronary artery of native heart without angina pectoris  Stable. Refill meds  - isosorbide dinitrate (ISORDIL) 10 MG tablet; Take 1 tablet by mouth 3 times daily  Dispense: 90 tablet; Refill: 5  - metoprolol tartrate (LOPRESSOR) 25 MG tablet; Take 0.5 tablets by mouth 2 times daily  Dispense: 30 tablet; Refill: 5    3. Dyslipidemia  controlled  -refill simvastatin (ZOCOR) 80 MG tablet; Take 1 tablet by mouth nightly  Dispense: 30 tablet; Refill: 5    4. CKD (chronic kidney disease) stage 3, GFR 30-59 ml/min  stable    5. Leg edema  Resolved. Continue lasix. Likely due to CHF  - potassium chloride (KLOR-CON) 8 MEQ extended release tablet; Take 1 tablet by mouth 2 times daily  Dispense: 60 tablet; Refill: 5  - furosemide (LASIX) 40 MG tablet; Take 1 tablet by mouth daily One po q day prn leg edema  Dispense: 30 tablet; Refill: 5    6. Atrial flutter, paroxysmal (HCC)  -controlled  -refill warfarin (COUMADIN) 3 MG tablet; Take 1 tablet by mouth daily  Dispense: 90 tablet;  Refill: 1  -needs INR in the next 1 week    7. Influenza vaccine needed  - INFLUENZA, QUADV, 3 YRS AND OLDER, IM, MDV, 0.5ML (FLUZONE QUADV)    8. At high risk for falls  -use walker  -he declines other treatment    They voiced understanding. All questions answered. They agreed with treatment plan. Educational materials given - see patient instructions. Discussed use, benefit, and side effects of prescribed medications. Reviewed health maintenance. Return in about 6 months (around 4/30/2018) for HTN. Electronically signed by Tawana Alejo MD on 10/30/2017 at 3:25 PM    On the basis of positive falls risk screening, assessment and plan is as follows: patient declines any further evaluation/treatment for increased falls risk.

## 2017-11-21 ENCOUNTER — CARE COORDINATION (OUTPATIENT)
Dept: CARE COORDINATION | Age: 82
End: 2017-11-21

## 2017-12-12 ENCOUNTER — HOSPITAL ENCOUNTER (OUTPATIENT)
Age: 82
Discharge: HOME | End: 2017-12-12
Payer: MEDICARE

## 2017-12-12 ENCOUNTER — OFFICE VISIT (OUTPATIENT)
Dept: FAMILY MEDICINE CLINIC | Age: 82
End: 2017-12-12
Payer: MEDICARE

## 2017-12-12 VITALS
DIASTOLIC BLOOD PRESSURE: 60 MMHG | WEIGHT: 152.4 LBS | HEART RATE: 56 BPM | SYSTOLIC BLOOD PRESSURE: 122 MMHG | BODY MASS INDEX: 26.02 KG/M2 | HEIGHT: 64 IN

## 2017-12-12 DIAGNOSIS — I82.A11 DVT OF AXILLARY VEIN, ACUTE RIGHT (HCC): ICD-10-CM

## 2017-12-12 DIAGNOSIS — Z79.01 ANTICOAGULANT LONG-TERM USE: ICD-10-CM

## 2017-12-12 DIAGNOSIS — M54.31 SCIATICA OF RIGHT SIDE: Primary | ICD-10-CM

## 2017-12-12 LAB — INR BLD: 1.45 (ref 0.85–1.13)

## 2017-12-12 PROCEDURE — G8427 DOCREV CUR MEDS BY ELIG CLIN: HCPCS | Performed by: FAMILY MEDICINE

## 2017-12-12 PROCEDURE — 85610 PROTHROMBIN TIME: CPT

## 2017-12-12 PROCEDURE — 99213 OFFICE O/P EST LOW 20 MIN: CPT | Performed by: FAMILY MEDICINE

## 2017-12-12 PROCEDURE — G8598 ASA/ANTIPLAT THER USED: HCPCS | Performed by: FAMILY MEDICINE

## 2017-12-12 PROCEDURE — 1123F ACP DISCUSS/DSCN MKR DOCD: CPT | Performed by: FAMILY MEDICINE

## 2017-12-12 PROCEDURE — 36415 COLL VENOUS BLD VENIPUNCTURE: CPT

## 2017-12-12 PROCEDURE — G8484 FLU IMMUNIZE NO ADMIN: HCPCS | Performed by: FAMILY MEDICINE

## 2017-12-12 PROCEDURE — G8417 CALC BMI ABV UP PARAM F/U: HCPCS | Performed by: FAMILY MEDICINE

## 2017-12-12 PROCEDURE — 1036F TOBACCO NON-USER: CPT | Performed by: FAMILY MEDICINE

## 2017-12-12 PROCEDURE — 4040F PNEUMOC VAC/ADMIN/RCVD: CPT | Performed by: FAMILY MEDICINE

## 2017-12-12 RX ORDER — PREDNISONE 20 MG/1
20 TABLET ORAL DAILY
Qty: 7 TABLET | Refills: 0 | Status: SHIPPED | OUTPATIENT
Start: 2017-12-12 | End: 2018-01-10 | Stop reason: SDUPTHER

## 2017-12-12 NOTE — PROGRESS NOTES
[facility] PROFESSIONAL SERVS  [facility]  [address] [address]  Dept: [phone]  Dept Fax: [phone]  Loc: [phone]  PROGRESS NOTE      Visit Date: 12/12/2017    Carlyle Estrella is a 80 y.o. male who presents today for:  Chief Complaint   Patient presents with    Hip Pain     RT, comes and goes       Subjective:  HPI     Right hip pain:  Pain in buttock area. Pain radiated to his right foot along posterior lower extremities. Worsened about 2 weeks ago. Hx of bilateral hip replacement. No leg swelling. He does have low back pain. He takes tylenol. No falls or injury. No leg weakness. Uses a walker.       Daughter is present    Review of Systems  Past Medical History:   Diagnosis Date    Arthritis     Atrial flutter, paroxysmal (HCC)     BPH (benign prostatic hyperplasia)     CAD (coronary artery disease)     Status post bypass    Cancer ([address]     CHF (congestive heart failure) (HCC)     Chronic kidney disease     Dyslipidemia     Hyperlipidemia     Movement disorder     Sick sinus syndrome ([address]     pace maker    Small bowel obstruction       Past Surgical History:   Procedure Laterality Date    CHOLECYSTECTOMY  11/15/2004    Dr Luis Mulligan  9/04/2009    Dr Lydia Sloan    ENDOSCOPY, COLON, DIAGNOSTIC      EYE SURGERY      JOINT REPLACEMENT      both hips    PACEMAKER INSERTION  2012    SKIN CANCER EXCISION      UPPER GASTROINTESTINAL ENDOSCOPY  10/05/2007    Dr Eliana Farley     Family History   Problem Relation Age of Onset    Early Death Mother     Heart Disease Brother     Cancer Brother     Heart Disease Father     Cancer Sister     Arthritis Paternal Aunt     Other Paternal Aunt      Social History   Substance Use Topics    Smoking status: Former Smoker     Types: Cigarettes     Quit date: 4/1/1993    Smokeless tobacco: Former User    Alcohol use No      Current Outpatient

## 2017-12-13 ENCOUNTER — TELEPHONE (OUTPATIENT)
Dept: FAMILY MEDICINE CLINIC | Age: 82
End: 2017-12-13

## 2017-12-22 ENCOUNTER — CARE COORDINATION (OUTPATIENT)
Dept: CARE COORDINATION | Age: 82
End: 2017-12-22

## 2018-01-01 ENCOUNTER — HOSPITAL ENCOUNTER (OUTPATIENT)
Age: 83
Discharge: HOME | End: 2018-10-29
Payer: MEDICARE

## 2018-01-01 ENCOUNTER — CARE COORDINATION (OUTPATIENT)
Dept: CARE COORDINATION | Age: 83
End: 2018-01-01

## 2018-01-01 ENCOUNTER — OFFICE VISIT (OUTPATIENT)
Dept: FAMILY MEDICINE CLINIC | Age: 83
End: 2018-01-01
Payer: MEDICARE

## 2018-01-01 ENCOUNTER — HOSPITAL ENCOUNTER (OUTPATIENT)
Age: 83
Discharge: HOME | End: 2018-07-09
Payer: MEDICARE

## 2018-01-01 ENCOUNTER — TELEPHONE (OUTPATIENT)
Dept: FAMILY MEDICINE CLINIC | Age: 83
End: 2018-01-01

## 2018-01-01 ENCOUNTER — HOSPITAL ENCOUNTER (OUTPATIENT)
Age: 83
Discharge: HOME | End: 2018-10-18
Payer: MEDICARE

## 2018-01-01 ENCOUNTER — ANTI-COAG VISIT (OUTPATIENT)
Dept: FAMILY MEDICINE CLINIC | Age: 83
End: 2018-01-01

## 2018-01-01 ENCOUNTER — HOSPITAL ENCOUNTER (OUTPATIENT)
Age: 83
Discharge: HOME | End: 2018-09-20
Payer: MEDICARE

## 2018-01-01 ENCOUNTER — TELEPHONE (OUTPATIENT)
Dept: CARDIOLOGY CLINIC | Age: 83
End: 2018-01-01

## 2018-01-01 ENCOUNTER — ANTI-COAG VISIT (OUTPATIENT)
Dept: FAMILY MEDICINE CLINIC | Age: 83
End: 2018-01-01
Payer: MEDICARE

## 2018-01-01 ENCOUNTER — HOSPITAL ENCOUNTER (OUTPATIENT)
Age: 83
Discharge: HOME | End: 2018-08-09
Payer: MEDICARE

## 2018-01-01 ENCOUNTER — NURSE ONLY (OUTPATIENT)
Dept: CARDIOLOGY CLINIC | Age: 83
End: 2018-01-01
Payer: MEDICARE

## 2018-01-01 VITALS
OXYGEN SATURATION: 94 % | HEIGHT: 63 IN | DIASTOLIC BLOOD PRESSURE: 60 MMHG | HEART RATE: 82 BPM | BODY MASS INDEX: 25.52 KG/M2 | WEIGHT: 144 LBS | SYSTOLIC BLOOD PRESSURE: 118 MMHG

## 2018-01-01 VITALS
WEIGHT: 146 LBS | BODY MASS INDEX: 25.87 KG/M2 | DIASTOLIC BLOOD PRESSURE: 70 MMHG | SYSTOLIC BLOOD PRESSURE: 130 MMHG | HEART RATE: 76 BPM | HEIGHT: 63 IN

## 2018-01-01 DIAGNOSIS — M54.31 SCIATICA OF RIGHT SIDE: ICD-10-CM

## 2018-01-01 DIAGNOSIS — I82.A11 DVT OF AXILLARY VEIN, ACUTE RIGHT (HCC): ICD-10-CM

## 2018-01-01 DIAGNOSIS — F03.90 DEMENTIA WITHOUT BEHAVIORAL DISTURBANCE, UNSPECIFIED DEMENTIA TYPE: ICD-10-CM

## 2018-01-01 DIAGNOSIS — Z95.0 PACEMAKER: Primary | ICD-10-CM

## 2018-01-01 DIAGNOSIS — J43.1 PANLOBULAR EMPHYSEMA (HCC): Chronic | ICD-10-CM

## 2018-01-01 DIAGNOSIS — I48.92 ATRIAL FLUTTER, PAROXYSMAL (HCC): ICD-10-CM

## 2018-01-01 DIAGNOSIS — Z79.01 LONG-TERM (CURRENT) USE OF ANTICOAGULANTS: ICD-10-CM

## 2018-01-01 DIAGNOSIS — I10 ESSENTIAL HYPERTENSION: ICD-10-CM

## 2018-01-01 DIAGNOSIS — I50.30 CHF WITH LEFT VENTRICULAR DIASTOLIC DYSFUNCTION, NYHA CLASS 2 (HCC): Primary | ICD-10-CM

## 2018-01-01 DIAGNOSIS — Z23 INFLUENZA VACCINE NEEDED: ICD-10-CM

## 2018-01-01 DIAGNOSIS — Z74.09 LIMITED MOBILITY: ICD-10-CM

## 2018-01-01 DIAGNOSIS — Z79.01 LONG TERM CURRENT USE OF ANTICOAGULANT THERAPY: ICD-10-CM

## 2018-01-01 DIAGNOSIS — R60.0 LEG EDEMA: ICD-10-CM

## 2018-01-01 DIAGNOSIS — I50.30 CHF WITH LEFT VENTRICULAR DIASTOLIC DYSFUNCTION, NYHA CLASS 2 (HCC): ICD-10-CM

## 2018-01-01 DIAGNOSIS — N18.30 CKD (CHRONIC KIDNEY DISEASE) STAGE 3, GFR 30-59 ML/MIN (HCC): ICD-10-CM

## 2018-01-01 DIAGNOSIS — F41.9 ANXIETY: Primary | ICD-10-CM

## 2018-01-01 DIAGNOSIS — M15.9 PRIMARY OSTEOARTHRITIS INVOLVING MULTIPLE JOINTS: Primary | ICD-10-CM

## 2018-01-01 DIAGNOSIS — I25.10 CORONARY ARTERY DISEASE INVOLVING NATIVE CORONARY ARTERY OF NATIVE HEART WITHOUT ANGINA PECTORIS: Primary | ICD-10-CM

## 2018-01-01 LAB
ALBUMIN SERPL-MCNC: 3.7 G/DL (ref 3.5–5.1)
ALP BLD-CCNC: 79 U/L (ref 38–126)
ALT SERPL-CCNC: 8 U/L (ref 11–66)
ANION GAP SERPL CALCULATED.3IONS-SCNC: 15 MEQ/L (ref 8–16)
AST SERPL-CCNC: 21 U/L (ref 5–40)
BILIRUB SERPL-MCNC: 0.3 MG/DL (ref 0.3–1.2)
BUN BLDV-MCNC: 37 MG/DL (ref 7–22)
CALCIUM SERPL-MCNC: 9.3 MG/DL (ref 8.5–10.5)
CHLORIDE BLD-SCNC: 101 MEQ/L (ref 98–111)
CO2: 28 MEQ/L (ref 23–33)
CREAT SERPL-MCNC: 1.7 MG/DL (ref 0.4–1.2)
ERYTHROCYTE [DISTWIDTH] IN BLOOD BY AUTOMATED COUNT: 13.6 % (ref 11.5–14.5)
ERYTHROCYTE [DISTWIDTH] IN BLOOD BY AUTOMATED COUNT: 47.4 FL (ref 35–45)
GFR SERPL CREATININE-BSD FRML MDRD: 38 ML/MIN/1.73M2
GLUCOSE BLD-MCNC: 102 MG/DL (ref 70–108)
HCT VFR BLD CALC: 38.1 % (ref 42–52)
HEMOGLOBIN: 12 GM/DL (ref 14–18)
INR BLD: 1.73 (ref 0.85–1.13)
INR BLD: 1.9 (ref 0.85–1.13)
INR BLD: 2.19 (ref 0.85–1.13)
INR BLD: 3.25 (ref 0.85–1.13)
MCH RBC QN AUTO: 30 PG (ref 26–33)
MCHC RBC AUTO-ENTMCNC: 31.5 GM/DL (ref 32.2–35.5)
MCV RBC AUTO: 95.3 FL (ref 80–94)
PLATELET # BLD: 207 THOU/MM3 (ref 130–400)
PMV BLD AUTO: 10.7 FL (ref 9.4–12.4)
POTASSIUM SERPL-SCNC: 4.7 MEQ/L (ref 3.5–5.2)
RBC # BLD: 4 MILL/MM3 (ref 4.7–6.1)
SODIUM BLD-SCNC: 144 MEQ/L (ref 135–145)
TOTAL PROTEIN: 6.9 G/DL (ref 6.1–8)
WBC # BLD: 7.2 THOU/MM3 (ref 4.8–10.8)

## 2018-01-01 PROCEDURE — 4040F PNEUMOC VAC/ADMIN/RCVD: CPT | Performed by: FAMILY MEDICINE

## 2018-01-01 PROCEDURE — 85610 PROTHROMBIN TIME: CPT

## 2018-01-01 PROCEDURE — G8417 CALC BMI ABV UP PARAM F/U: HCPCS | Performed by: FAMILY MEDICINE

## 2018-01-01 PROCEDURE — 3288F FALL RISK ASSESSMENT DOCD: CPT | Performed by: FAMILY MEDICINE

## 2018-01-01 PROCEDURE — 93288 INTERROG EVL PM/LDLS PM IP: CPT | Performed by: INTERNAL MEDICINE

## 2018-01-01 PROCEDURE — 99214 OFFICE O/P EST MOD 30 MIN: CPT | Performed by: FAMILY MEDICINE

## 2018-01-01 PROCEDURE — 36415 COLL VENOUS BLD VENIPUNCTURE: CPT

## 2018-01-01 PROCEDURE — G8598 ASA/ANTIPLAT THER USED: HCPCS | Performed by: FAMILY MEDICINE

## 2018-01-01 PROCEDURE — 80053 COMPREHEN METABOLIC PANEL: CPT

## 2018-01-01 PROCEDURE — 1100F PTFALLS ASSESS-DOCD GE2>/YR: CPT | Performed by: FAMILY MEDICINE

## 2018-01-01 PROCEDURE — 1036F TOBACCO NON-USER: CPT | Performed by: FAMILY MEDICINE

## 2018-01-01 PROCEDURE — 85027 COMPLETE CBC AUTOMATED: CPT

## 2018-01-01 PROCEDURE — 93793 ANTICOAG MGMT PT WARFARIN: CPT | Performed by: FAMILY MEDICINE

## 2018-01-01 PROCEDURE — 90662 IIV NO PRSV INCREASED AG IM: CPT | Performed by: FAMILY MEDICINE

## 2018-01-01 PROCEDURE — G8427 DOCREV CUR MEDS BY ELIG CLIN: HCPCS | Performed by: FAMILY MEDICINE

## 2018-01-01 PROCEDURE — 1123F ACP DISCUSS/DSCN MKR DOCD: CPT | Performed by: FAMILY MEDICINE

## 2018-01-01 PROCEDURE — G8482 FLU IMMUNIZE ORDER/ADMIN: HCPCS | Performed by: FAMILY MEDICINE

## 2018-01-01 PROCEDURE — G0008 ADMIN INFLUENZA VIRUS VAC: HCPCS | Performed by: FAMILY MEDICINE

## 2018-01-01 RX ORDER — ISOSORBIDE DINITRATE 10 MG/1
10 TABLET ORAL 3 TIMES DAILY
Qty: 90 TABLET | Refills: 5 | Status: SHIPPED | OUTPATIENT
Start: 2018-01-01

## 2018-01-01 RX ORDER — HYDROXYZINE HYDROCHLORIDE 25 MG/1
25 TABLET, FILM COATED ORAL 2 TIMES DAILY PRN
Qty: 60 TABLET | Refills: 0 | Status: SHIPPED | OUTPATIENT
Start: 2018-01-01 | End: 2018-01-01

## 2018-01-01 RX ORDER — PREDNISONE 20 MG/1
20 TABLET ORAL DAILY
Qty: 7 TABLET | Refills: 0 | Status: SHIPPED | OUTPATIENT
Start: 2018-01-01 | End: 2018-01-01 | Stop reason: ALTCHOICE

## 2018-01-01 RX ORDER — POTASSIUM CHLORIDE 600 MG/1
8 TABLET, FILM COATED, EXTENDED RELEASE ORAL 2 TIMES DAILY
Qty: 60 TABLET | Refills: 5 | Status: ON HOLD | OUTPATIENT
Start: 2018-01-01 | End: 2019-01-01 | Stop reason: HOSPADM

## 2018-01-01 RX ORDER — ASPIRIN 81 MG/1
81 TABLET ORAL DAILY
Qty: 30 TABLET | Refills: 3
Start: 2018-01-01

## 2018-01-01 RX ORDER — PRAVASTATIN SODIUM 40 MG
40 TABLET ORAL EVERY EVENING
Qty: 90 TABLET | Refills: 1 | Status: SHIPPED | OUTPATIENT
Start: 2018-01-01 | End: 2019-01-01 | Stop reason: SDUPTHER

## 2018-01-01 RX ORDER — WARFARIN SODIUM 3 MG/1
3 TABLET ORAL DAILY
Qty: 90 TABLET | Refills: 1 | Status: CANCELLED | OUTPATIENT
Start: 2018-01-01

## 2018-01-01 RX ORDER — FUROSEMIDE 40 MG/1
40 TABLET ORAL DAILY
Qty: 30 TABLET | Refills: 5 | Status: ON HOLD | OUTPATIENT
Start: 2018-01-01 | End: 2019-01-01 | Stop reason: HOSPADM

## 2018-01-01 ASSESSMENT — ENCOUNTER SYMPTOMS
DYSPNEA ASSOCIATED WITH: EXERTION
COUGH: 1
WHEEZING: 0
SHORTNESS OF BREATH: 0
BACK PAIN: 1
SHORTNESS OF BREATH: 1

## 2018-01-01 ASSESSMENT — PATIENT HEALTH QUESTIONNAIRE - PHQ9
SUM OF ALL RESPONSES TO PHQ9 QUESTIONS 1 & 2: 2
SUM OF ALL RESPONSES TO PHQ QUESTIONS 1-9: 2
1. LITTLE INTEREST OR PLEASURE IN DOING THINGS: 1
2. FEELING DOWN, DEPRESSED OR HOPELESS: 1

## 2018-01-09 ENCOUNTER — CARE COORDINATION (OUTPATIENT)
Dept: CARE COORDINATION | Age: 83
End: 2018-01-09

## 2018-01-09 ENCOUNTER — TELEPHONE (OUTPATIENT)
Dept: FAMILY MEDICINE CLINIC | Age: 83
End: 2018-01-09

## 2018-01-09 DIAGNOSIS — I25.10 CORONARY ARTERY DISEASE INVOLVING NATIVE CORONARY ARTERY OF NATIVE HEART WITHOUT ANGINA PECTORIS: Primary | ICD-10-CM

## 2018-01-09 DIAGNOSIS — M54.31 SCIATICA OF RIGHT SIDE: ICD-10-CM

## 2018-01-09 ASSESSMENT — ENCOUNTER SYMPTOMS: DYSPNEA ASSOCIATED WITH: EXERTION

## 2018-01-09 NOTE — CARE COORDINATION
Ambulatory Care Coordination Note  1/9/2018  CM Risk Score: 7  Marcel Mortality Risk Score: 38.11    ACC: Carter Pierre RN    Summary Note: Spoke with wife, Jeimy Bernal. Called regarding simvastatin. Neymar cannot swallow the pill. Requesting change to pravastatin for shape and size. Also states ongoing issues with sciatica pain. Was given prednisone, 7 pills. Ordered daily. States he took the prednisone as needed when pain was not tolerable. Discussed usually taken daily for 7 daily, consecutively not as needed. Requesting another prescription or prn pain medication. Note routed to PCP. Denies leg swelling, weight gain, changes in breathing.      Congestive Heart Failure Assessment    Are you currently restricting fluids?:  No Restriction  Do you understand a low sodium diet?:  Yes  Do you understand how to read food labels?:  Yes  How many restaurant meals do you eat per week?:  1-2  Do you salt your food before tasting it?:  No     No patient-reported symptoms      Symptoms:   None:  Yes      Symptom course:  stable  Weight trend:  stable  Salt intake watch compared to last visit:  stable      and   COPD Assessment    Does the patient understand envrionmental exposure?:  Yes  Is the patient able to verbalize Rescue vs. Long Acting medications?:  No  Does the patient have a nebulizer?:  No  Does the patient use a space with inhaled medications?:  No     No patient-reported symptoms         Symptoms:   None:  Yes      Symptom course:  stable  Breathlessness:  exertion  Change in chronic cough?:  No/At Baseline  Change in sputum?:  No/At Baseline  Have you had a recent diagnosis of pneumonia either by PCP or at a hospital?:  No               Care Coordination Interventions    Program Enrollment:  Rising Risk  Referral from Primary Care Provider:  Yes  Suggested Interventions and Community Resources  Zone Management Tools:  Completed (Comment: mailed COPD and CHF 9/5/17)         Goals Addressed     None

## 2018-01-10 RX ORDER — PRAVASTATIN SODIUM 40 MG
40 TABLET ORAL EVERY EVENING
Qty: 90 TABLET | Refills: 1 | Status: SHIPPED | OUTPATIENT
Start: 2018-01-10 | End: 2018-04-30 | Stop reason: SDUPTHER

## 2018-01-10 RX ORDER — PREDNISONE 20 MG/1
20 TABLET ORAL DAILY
Qty: 7 TABLET | Refills: 0 | Status: SHIPPED | OUTPATIENT
Start: 2018-01-10 | End: 2018-01-25 | Stop reason: ALTCHOICE

## 2018-01-11 ENCOUNTER — HOSPITAL ENCOUNTER (OUTPATIENT)
Age: 83
Discharge: HOME | End: 2018-01-11
Payer: MEDICARE

## 2018-01-11 LAB
INR BLD: 2.01
INR BLD: 2.01 (ref 0.85–1.13)

## 2018-01-11 PROCEDURE — 85610 PROTHROMBIN TIME: CPT

## 2018-01-11 PROCEDURE — 36415 COLL VENOUS BLD VENIPUNCTURE: CPT

## 2018-01-15 ENCOUNTER — TELEPHONE (OUTPATIENT)
Dept: FAMILY MEDICINE CLINIC | Age: 83
End: 2018-01-15

## 2018-01-15 DIAGNOSIS — Z79.01 LONG-TERM (CURRENT) USE OF ANTICOAGULANTS: ICD-10-CM

## 2018-01-15 DIAGNOSIS — I82.A11 DVT OF AXILLARY VEIN, ACUTE RIGHT (HCC): Primary | ICD-10-CM

## 2018-01-24 DIAGNOSIS — I82.A11 DVT OF AXILLARY VEIN, ACUTE RIGHT (HCC): ICD-10-CM

## 2018-01-24 DIAGNOSIS — I48.92 ATRIAL FLUTTER, PAROXYSMAL (HCC): Primary | ICD-10-CM

## 2018-01-24 DIAGNOSIS — Z79.01 LONG-TERM (CURRENT) USE OF ANTICOAGULANTS: ICD-10-CM

## 2018-01-25 ENCOUNTER — TELEPHONE (OUTPATIENT)
Dept: FAMILY MEDICINE CLINIC | Age: 83
End: 2018-01-25

## 2018-01-25 ENCOUNTER — CARE COORDINATION (OUTPATIENT)
Dept: CARE COORDINATION | Age: 83
End: 2018-01-25

## 2018-01-25 DIAGNOSIS — M54.31 SCIATICA OF RIGHT SIDE: ICD-10-CM

## 2018-01-25 RX ORDER — PREDNISONE 20 MG/1
20 TABLET ORAL DAILY
Qty: 7 TABLET | Refills: 0 | Status: SHIPPED | OUTPATIENT
Start: 2018-01-25 | End: 2018-02-12

## 2018-01-25 NOTE — CARE COORDINATION
Spoke with wife. Pain in buttock and leg as before. Discussed prednisone and PT. Susannah Bear will be reluctant to do PT but will try it. Would like [facility] for PT. Informed pravastatin dose is ok.

## 2018-01-31 ENCOUNTER — HOSPITAL ENCOUNTER (OUTPATIENT)
Dept: PHYSICAL THERAPY | Age: 83
Setting detail: THERAPIES SERIES
Discharge: HOME | End: 2018-01-31
Payer: MEDICARE

## 2018-01-31 PROCEDURE — 97110 THERAPEUTIC EXERCISES: CPT

## 2018-01-31 PROCEDURE — G8979 MOBILITY GOAL STATUS: HCPCS

## 2018-01-31 PROCEDURE — 97163 PT EVAL HIGH COMPLEX 45 MIN: CPT

## 2018-01-31 PROCEDURE — G8978 MOBILITY CURRENT STATUS: HCPCS

## 2018-01-31 ASSESSMENT — PAIN SCALES - GENERAL: PAINLEVEL_OUTOF10: 4

## 2018-01-31 ASSESSMENT — PAIN DESCRIPTION - LOCATION: LOCATION: BACK;LEG

## 2018-01-31 ASSESSMENT — PAIN DESCRIPTION - ORIENTATION: ORIENTATION: RIGHT

## 2018-01-31 NOTE — PROGRESS NOTES
[place]       See Medical History Questionnaire for information related to allergies and medications. Subjective:  Chart Reviewed: Yes  Patient assessed for rehabilitation services?: Yes  Comments: f/u with Dr. Mary Anne Garay 4/30. Pacer check and Dr. Johanny Solorzano 5/4/18. cert due 9/2/19. Subjective: LBP x 50 + years. 10 years ago R LE pain . Did do back surgery in 2010 \"put a clip\" in lumbar spine but did not lessen pain. Increased back pain and leg pain 2 years ago, started using RW 2 years ago. Patient's wife called due to increased LBP, previously had taken prescription of prednisone x 7 days \"this made my back pain go away\", then my back pain came back and a new round of 7 days of prednisone started started today. Pain:  Patient Currently in Pain: Yes  Pain Assessment: 0-10  Pain Level: 4  Pain Location: Back, Leg  Pain Orientation: Right  Pain Radiating Towards: % of the day, high 10/10, average 7/10 , R LE to calf/foot 100% of the day, high 10/10, average 7/10     Social/Functional History:    Lives With: Spouse  Type of Home: House  Home Layout: One level, Bed/Bath upstairs  Home Access: Stairs to enter with rails  Entrance Stairs - Number of Steps: 3  Entrance Stairs - Rails: Right  Home Equipment: 4 wheeled walker, Rolling walker     Bathroom Shower/Tub: Shower chair with back, Walk-in shower  Bathroom Toilet: Handicap height  Bathroom Equipment: Grab bars in shower  IADL Comments: sleeps on recliner x 5  years due to LBP.  uses RW at all times. reports pain lessens with sitting and bending forward.        Receives Help From: Family  ADL Assistance: Independent  Homemaking Assistance: Independent  Homemaking Responsibilities: Yes  Ambulation Assistance: Independent  Transfer Assistance: Independent    Active : No  Mode of Transportation: Car  Education: daughter, Errol Ortiz, is POA and drives patient and 80year old wife to all appointments  Additional Comments: increased LBP with walking x 5 given                   Plan:  Times per week: 2 x per week  Plan weeks: 5 weeks  Specific instructions for Next Treatment: gentle DLSP in neutral position, LE strengthening, progress to standing and stretching with lumbar extension AROM, balance exercises  Current Treatment Recommendations: Strengthening, ROM, Balance Training, Gait Training, Home Exercise Program    History: Personal factors or comorbidities that impact plan of care - High Complexity: 3 or more personal factors or comorbidities. See history section above for details. Examination: Body structures and functions, activity limitations, participation restrictions; using standardized tests and measures - High Complexity: 4 or more body structures and functional, activity limitations and/or participation restrictions. See restrictions and objective section above for details. Clinical Presentation: High - Unstable with Unpredictable Characteristics: 50+ year history of LBP, unsuccessful lumbar surgery in 2010, B THR, R hip injection 6 months ago, pacemaker, HTN, B carpal tunnel. unable to do multiple modalities due to pacemaker with e-stim and US contraindicated.:    Decision Making: High Complexity due to 50+ year history of LBP, unsuccessful lumbar surgery in 2010, B THR, R hip injection 6 months ago, pacemaker, HTN, B carpal tunnel. unable to do multiple modalities due to pacemaker with e-stim and US contraindicated. .  Decision making was based on patient assessment and decision making process in determining plan of care and establishing reasonable expectations for measurable functional outcomes. Evaluation Complexity: Based on the findings of patient history, examination, clinical presentation, and decision making during this evaluation, the evaluation of Corinna Duran  is of high complexity.     Goals:  Patient goals : to get my back feeling better    Short term goals  Time Frame for Short term goals: deferred to LTG's    Long term goals  Time Frame for Long term goals : 5 weeks  Long term goal 1: increase AROM lumbar extension to 25%, R hip flexion to 50 degrees to allow patient to stand erect with walking with decreased LBP to 4/10 with walking x 5 minutes in house with RW  Long term goal 2: increase strength abdominal muscles to fair with 15 reps DLSP and R LE to 3+/5 to allow patient to go from sit to stand with decreased LBP and R LE pain to 4/10  Long term goal 3: I with HEP as prescribed to allow patient to report able to sleep x 4 hours without awakening due to LBP or R LE pain    PT G-Codes  Functional Assessment Tool Used: AM-PAC BASIC MOBILITY  Score: 22  Functional Limitation: Mobility: Walking and moving around  Mobility: Walking and Moving Around Current Status (): At least 80 percent but less than 100 percent impaired, limited or restricted  Mobility: Walking and Moving Around Goal Status (): At least 60 percent but less than 80 percent impaired, limited or restricted    Enid Freedman.  Lidia Benitez # [id number]

## 2018-02-05 ENCOUNTER — TELEPHONE (OUTPATIENT)
Dept: FAMILY MEDICINE CLINIC | Age: 83
End: 2018-02-05

## 2018-02-05 ENCOUNTER — HOSPITAL ENCOUNTER (OUTPATIENT)
Dept: PHYSICAL THERAPY | Age: 83
Setting detail: THERAPIES SERIES
Discharge: HOME | End: 2018-02-05
Payer: MEDICARE

## 2018-02-05 ENCOUNTER — CARE COORDINATION (OUTPATIENT)
Dept: CARE COORDINATION | Age: 83
End: 2018-02-05

## 2018-02-05 DIAGNOSIS — M54.31 SCIATICA OF RIGHT SIDE: ICD-10-CM

## 2018-02-05 DIAGNOSIS — M48.061 SPINAL STENOSIS OF LUMBAR REGION WITHOUT NEUROGENIC CLAUDICATION: Primary | Chronic | ICD-10-CM

## 2018-02-05 PROCEDURE — 97110 THERAPEUTIC EXERCISES: CPT

## 2018-02-05 RX ORDER — TRAMADOL HYDROCHLORIDE 50 MG/1
50 TABLET ORAL EVERY 6 HOURS PRN
Qty: 12 TABLET | Refills: 0 | Status: SHIPPED | OUTPATIENT
Start: 2018-02-05 | End: 2018-02-08

## 2018-02-06 NOTE — TELEPHONE ENCOUNTER
Spoke with Nahomy Hagen. Per Dr. Linda Laws, start tramadol and schedule for later this week to see how pain is. Has appointment for PT at [facility] at 1:30 on Friday. Would like an appointment close to PT time. Spoke with Jess Godinez at office. Ok to schedule at 11:30 Friday. Anjelica verbalizes understanding.

## 2018-02-09 ENCOUNTER — HOSPITAL ENCOUNTER (OUTPATIENT)
Dept: PHYSICAL THERAPY | Age: 83
Setting detail: THERAPIES SERIES
Discharge: HOME | End: 2018-02-09
Payer: MEDICARE

## 2018-02-12 ENCOUNTER — OFFICE VISIT (OUTPATIENT)
Dept: FAMILY MEDICINE CLINIC | Age: 83
End: 2018-02-12
Payer: MEDICARE

## 2018-02-12 ENCOUNTER — HOSPITAL ENCOUNTER (OUTPATIENT)
Dept: PHYSICAL THERAPY | Age: 83
Setting detail: THERAPIES SERIES
Discharge: HOME | End: 2018-02-12
Payer: MEDICARE

## 2018-02-12 ENCOUNTER — HOSPITAL ENCOUNTER (OUTPATIENT)
Age: 83
Discharge: HOME | End: 2018-02-12
Payer: MEDICARE

## 2018-02-12 ENCOUNTER — ANTI-COAG VISIT (OUTPATIENT)
Dept: FAMILY MEDICINE CLINIC | Age: 83
End: 2018-02-12

## 2018-02-12 VITALS
WEIGHT: 148.4 LBS | HEART RATE: 58 BPM | SYSTOLIC BLOOD PRESSURE: 122 MMHG | HEIGHT: 64 IN | BODY MASS INDEX: 25.33 KG/M2 | DIASTOLIC BLOOD PRESSURE: 64 MMHG

## 2018-02-12 DIAGNOSIS — I48.92 ATRIAL FLUTTER, PAROXYSMAL (HCC): ICD-10-CM

## 2018-02-12 DIAGNOSIS — L60.0 INGROWN TOENAIL: ICD-10-CM

## 2018-02-12 DIAGNOSIS — M48.061 SPINAL STENOSIS OF LUMBAR REGION WITHOUT NEUROGENIC CLAUDICATION: Primary | Chronic | ICD-10-CM

## 2018-02-12 DIAGNOSIS — I82.A11 DVT OF AXILLARY VEIN, ACUTE RIGHT (HCC): ICD-10-CM

## 2018-02-12 LAB — INR BLD: 2.01 (ref 0.85–1.13)

## 2018-02-12 PROCEDURE — 97110 THERAPEUTIC EXERCISES: CPT

## 2018-02-12 PROCEDURE — 4040F PNEUMOC VAC/ADMIN/RCVD: CPT | Performed by: FAMILY MEDICINE

## 2018-02-12 PROCEDURE — 99213 OFFICE O/P EST LOW 20 MIN: CPT | Performed by: FAMILY MEDICINE

## 2018-02-12 PROCEDURE — 1123F ACP DISCUSS/DSCN MKR DOCD: CPT | Performed by: FAMILY MEDICINE

## 2018-02-12 PROCEDURE — 1036F TOBACCO NON-USER: CPT | Performed by: FAMILY MEDICINE

## 2018-02-12 PROCEDURE — 36415 COLL VENOUS BLD VENIPUNCTURE: CPT

## 2018-02-12 PROCEDURE — G8417 CALC BMI ABV UP PARAM F/U: HCPCS | Performed by: FAMILY MEDICINE

## 2018-02-12 PROCEDURE — G8427 DOCREV CUR MEDS BY ELIG CLIN: HCPCS | Performed by: FAMILY MEDICINE

## 2018-02-12 PROCEDURE — G8598 ASA/ANTIPLAT THER USED: HCPCS | Performed by: FAMILY MEDICINE

## 2018-02-12 PROCEDURE — G8484 FLU IMMUNIZE NO ADMIN: HCPCS | Performed by: FAMILY MEDICINE

## 2018-02-12 PROCEDURE — 85610 PROTHROMBIN TIME: CPT

## 2018-02-12 RX ORDER — TRAMADOL HYDROCHLORIDE 50 MG/1
50 TABLET ORAL EVERY 6 HOURS PRN
COMMUNITY
End: 2018-04-30

## 2018-02-12 NOTE — PROGRESS NOTES
[facility] PHYSICAL THERAPY  DAILY NOTE  [facility].     Time In: 6947  Time Out: [place] 10  Minutes: 29  Timed Code Treatment Minutes: 29 Minutes     Date: 2018  Patient Name: Sadiq Hodge,  Gender:  male        CSN: [id number]   : 1923  (80 y.o.)  Referral Date : 18    Referring Practitioner: Dr. John Arzate      Diagnosis: sciatica of right side, buttock and hip pain  Treatment Diagnosis: LBP, difficulty walking   Additional Pertinent Hx: See Medical History Questionnaire. Reviewed meds and allergies. Pacemaker. Did have cortisone injection in R hip 6 months ago. Patient did have back operation in  \"but it didn't help at all\". , B THR, Platinum, CAGS x 5 in                   General:  PT Visit Information  Onset Date: 18  PT Insurance Information: Medicare- pays at 80%, $3700 cap per calendar year. Modalities covered except ionto/MHP/CP not covered. Total # of Visits Approved: 10  Total # of Visits to Date: 3  Plan of Care/Certification Expiration Date: 18  Progress Note Counter: 3/10 for PN. Subjective:  Comments: f/u with Dr. Edison Stanley . Pacer check and Dr. Jude Meyers 18. cert due . Subjective: Pt reporting not having any pain and reporting doing HEP 2 x a day. Pain:Denies. Objective  Exercises  Exercise 1: supine with HOB slightly elevated and 2 pillows  Exercise 2: abdominal bracing 10 x 3 seconds  Exercise 3: quad set R and L 10 x 3 seconds  Exercise 4: heel slides 2 x 5 reps each LE  Exercise 5: sitting heel and toe raises 10 x  Exercise 6: gait training with cues to bring walker back toward him.  Pt also needing cues during session to push up from chair and to not pull up from walker  Exercise 7: LAQ 10x 3 seconds  Exercise 8: NuStep seat 7 holes showing, UE 9 x 5 minutes  Exercise 9: standing heel/toe raises x 10  Exercise 10: mini squats x 10         Activity Tolerance:  Activity Tolerance: Patient Tolerated treatment well  Activity Tolerance: slight fatigue during session. Assessment:  Assessment: Added standing exercises without pain but pt did admit to having fatigue. Pt still needing cues for keeping RW closer to him and cues for hand placement with sit to stand tranfers. Pt denies pain at end of session but did admit to a little fatigue. Prognosis: Good       Patient Education:  Patient Education: Continue with HEP and monitor response to progressions.                       Plan:  Times per week: 2 x per week  Plan weeks: 5 weeks  Specific instructions for Next Treatment: gentle DLSP in neutral position, LE strengthening, progress to standing and stretching with lumbar extension AROM, balance exercises  Current Treatment Recommendations: Strengthening, ROM, Balance Training, Gait Training, Home Exercise Program    Goals:  Patient goals : to get my back feeling better    Short term goals  Time Frame for Short term goals: deferred to LTG's    Long term goals  Time Frame for Long term goals : 5 weeks  Long term goal 1: increase AROM lumbar extension to 25%, R hip flexion to 50 degrees to allow patient to stand erect with walking with decreased LBP to 4/10 with walking x 5 minutes in house with RW  Long term goal 2: increase strength abdominal muscles to fair with 15 reps DLSP and R LE to 3+/5 to allow patient to go from sit to stand with decreased LBP and R LE pain to 4/10  Long term goal 3: I with HEP as prescribed to allow patient to report able to sleep x 4 hours without awakening due to LBP or R LE pain         Lukas Gaxiola, [id number]

## 2018-02-12 NOTE — PROGRESS NOTES
Neurological: He is alert and oriented to person, place, and time. Psychiatric: He has a normal mood and affect. His behavior is normal.   Vitals reviewed. Low back:  ttp of midline and right lower lumbar area. Decrease ROM in all planes including extension. Left great toe:  Lateral and medial nail folds have ingrown nails. No drainage. Mild ttp. No open wound. Impression/Plan:  1. Spinal stenosis of lumbar region without neurogenic claudication  -continue PT  -he is not needing ultram as of now. May need limited prn ultram for flare ups.   -continue tylenol    2. Ingrown toenail  No infection. Referral to podiatry for possible excision.   - External Referral To Podiatry      They voiced understanding. All questions answered. They agreed with treatment plan. Reviewed health maintenance. Return if symptoms worsen or fail to improve.       Electronically signed by Bess Buchanan MD on 2/12/2018 at 1:04 PM

## 2018-02-16 ENCOUNTER — HOSPITAL ENCOUNTER (OUTPATIENT)
Dept: PHYSICAL THERAPY | Age: 83
Setting detail: THERAPIES SERIES
Discharge: HOME | End: 2018-02-16
Payer: MEDICARE

## 2018-02-16 ENCOUNTER — HOSPITAL ENCOUNTER (EMERGENCY)
Age: 83
Discharge: HOME | End: 2018-02-16
Payer: MEDICARE

## 2018-02-16 VITALS
TEMPERATURE: 97.6 F | RESPIRATION RATE: 20 BRPM | HEIGHT: 63 IN | BODY MASS INDEX: 26.22 KG/M2 | WEIGHT: 148 LBS | HEART RATE: 75 BPM | DIASTOLIC BLOOD PRESSURE: 58 MMHG | SYSTOLIC BLOOD PRESSURE: 111 MMHG

## 2018-02-16 DIAGNOSIS — S40.012A CONTUSION OF LEFT SHOULDER, INITIAL ENCOUNTER: Primary | ICD-10-CM

## 2018-02-16 PROCEDURE — 99213 OFFICE O/P EST LOW 20 MIN: CPT | Performed by: NURSE PRACTITIONER

## 2018-02-16 PROCEDURE — 99212 OFFICE O/P EST SF 10 MIN: CPT

## 2018-02-16 PROCEDURE — 97110 THERAPEUTIC EXERCISES: CPT

## 2018-02-16 ASSESSMENT — ENCOUNTER SYMPTOMS
NAUSEA: 0
SHORTNESS OF BREATH: 0
VOMITING: 0
COUGH: 0
DIARRHEA: 0

## 2018-02-16 NOTE — PROGRESS NOTES
bruising in L shoulder     Pain:  Patient Currently in Pain: Denies         Objective                                                                                                                          Exercises  Exercise 1: supine with HOB slightly elevated and 2 pillows  Exercise 2: abdominal bracing 10 x 5 seconds  Exercise 3: quad set R and L 10 x 5 seconds  Exercise 4: heel slides 10 reps each LE  Exercise 5: sitting heel and toe raises 10 x  Exercise 6: gait trainning with cues to bring walker back toward him. Pt also needing cues during session to push up from chair and to not pull up from walker  Exercise 7: LAQ 10x 5 seconds  Exercise 8: NuStep seat 7 holes showing, LE only x 5 minutes  Exercise 9: standing heel/toe raises x 10  Exercise 10: mini squats x 10         Activity Tolerance:  Activity Tolerance: Patient Tolerated treatment well  Activity Tolerance: no pain at all during session in R or L hip    Assessment: Body structures, Functions, Activity limitations: Decreased ROM, Decreased strength  Assessment: added SLR and gentle exercises for LE only today. advised to stop at family MD due to bruising in L shoulder bruising, notes starts at pacemaker and wraps around to shoulder blade L  Prognosis: Good  REQUIRES PT FOLLOW UP: Yes    Patient Education:  Patient Education: Continue with HEP and monitor response to progressions.   Barriers to Learning: poor memory, handout given for SLR                      Plan:  Times per week: 2 x per week  Plan weeks: 5 weeks  Specific instructions for Next Treatment: gentle DLSP in neutral position, LE strengthening, progress to standing and stretching with lumbar extension AROM, balance exercises  Current Treatment Recommendations: Strengthening, ROM, Balance Training, Gait Training, Home Exercise Program    Goals:  Patient goals : to get my back feeling better    Short term goals  Time Frame for Short term goals: deferred to LTG's    Long term goals  Time

## 2018-02-19 ENCOUNTER — HOSPITAL ENCOUNTER (OUTPATIENT)
Dept: PHYSICAL THERAPY | Age: 83
Setting detail: THERAPIES SERIES
Discharge: HOME | End: 2018-02-19
Payer: MEDICARE

## 2018-02-19 PROCEDURE — 97110 THERAPEUTIC EXERCISES: CPT

## 2018-02-19 NOTE — PROGRESS NOTES
[facility] PHYSICAL THERAPY  DAILY NOTE  [facility].     Time In: 7196  Time Out: 1325  Minutes: 30  Timed Code Treatment Minutes: 30 Minutes     Date: 2018  Patient Name: Corinna Duran,  Gender:  male        CSN: [id number]   : 1923  (80 y.o.)  Referral Date : 18    Referring Practitioner: Dr. Milli Yu      Diagnosis: sciatica of right side, buttock and hip pain  Treatment Diagnosis: LBP, difficulty walking   Additional Pertinent Hx: See Medical History Questionnaire. Reviewed meds and allergies. Pacemaker. Did have cortisone injection in R hip 6 months ago. Patient did have back operation in  \"but it didn't help at all\". , B THR, [place], CAGS x 5 in           Position Activity Restriction  Other position/activity restrictions: Pacemaker, no shoveling snow, no sweeping floor, very [place]       General:  PT Visit Information  Onset Date: 18  PT Insurance Information: Medicare- pays at 80%, $3700 cap per calendar year. Modalities covered except ionto/MHP/CP not covered. Total # of Visits Approved: 10  Total # of Visits to Date: 5  Plan of Care/Certification Expiration Date: 18  Progress Note Due Date: 18  Progress Note Counter: 5/10 for PN. Subjective:  Chart Reviewed: Yes  Patient assessed for rehabilitation services?: Yes  Comments: f/u with Dr. Loreto Paulson . Pacer check and Dr. Laura Whitlock 18. cert due 14. Subjective: reports to urgent care due to L shoulder bruising, had admitted to urgent care that he had fallen.  no restrictions to resume therapy     Pain:  Patient Currently in Pain: Denies         Objective                                                                                                                          Exercises  Exercise 1: supine with HOB slightly elevated and 2 pillows  Exercise 2: abdominal bracing 10 x 5 seconds  Exercise 3: quad set R and L 10 x 5 seconds  Exercise 4: heel slides 15 reps each LE  Exercise 5: sitting heel and toe raises 10 x  Exercise 6: SAQ with abdominal bracing 10 x no pain at all  Exercise 7: LTR 5 x 5 seconds  Exercise 8: supine SLR 10 x slowly, slight pain hip 2/10  Exercise 9: supine hip abduction 10 x increased hip pain B to 3/10  Exercise 10: LAQ 10x 5 seconds  Exercise 11: standing heel/toe raises x 10  Exercise 12: mini squats x 10  Exercise 13: standing hip abduction 10 x with soreness in hip 3/10         Activity Tolerance:  Activity Tolerance: Patient Tolerated treatment well  Activity Tolerance:      Assessment: Body structures, Functions, Activity limitations: Decreased ROM, Decreased strength  Assessment: patient cleared with L shoulder to resume therapy, bruise was due to strain of shoulder, not associated with using arms on NuStep, still only will do LE on NuStep. SAQ and LTR added to HEP, SLR, hip abduction in supine and standing increased pain so held from HEP  Prognosis: Good  REQUIRES PT FOLLOW UP: Yes    Patient Education:  Patient Education: Continue with HEP and monitor response to progressions.   Barriers to Learning: handout for HEP given, education with patient and daughter who brings patient to/from therapy                      Plan:  Times per week: 2 x per week  Plan weeks: 5 weeks  Specific instructions for Next Treatment: gentle DLSP in neutral position, LE strengthening, progress to standing and stretching with lumbar extension AROM, balance exercises  Current Treatment Recommendations: Strengthening, ROM, Balance Training, Gait Training, Home Exercise Program    Goals:  Patient goals : to get my back feeling better    Short term goals  Time Frame for Short term goals: deferred to LTG's    Long term goals  Time Frame for Long term goals : 5 weeks  Long term goal 1: increase AROM lumbar extension to 25%, R hip flexion to 50 degrees to allow patient to stand erect with walking with decreased LBP to 4/10 with walking x 5 minutes

## 2018-02-23 ENCOUNTER — HOSPITAL ENCOUNTER (OUTPATIENT)
Dept: PHYSICAL THERAPY | Age: 83
Setting detail: THERAPIES SERIES
Discharge: HOME | End: 2018-02-23
Payer: MEDICARE

## 2018-02-23 PROCEDURE — 97110 THERAPEUTIC EXERCISES: CPT

## 2018-02-23 NOTE — PROGRESS NOTES
pain at all  Exercise 7: LTR 5 x 5 seconds  Exercise 8: supine SLR 10 x slowly, slight pain hip 2/10  Exercise 9: supine hip abduction 10 x increased hip pain B to 3/10  Exercise 10: LAQ 10x 5 seconds  Exercise 11: standing heel/toe raises x 10  Exercise 12: mini squats x 10  Exercise 13: standing hip abduction 10 x with soreness in hip 3/10         Activity Tolerance:  Activity Tolerance: Patient Tolerated treatment well  Activity Tolerance:      Assessment: Body structures, Functions, Activity limitations: Decreased ROM, Decreased strength  Assessment: bruising on L shoulder improved, no UE with NuStep again. progressing well with decreased pain as muscles are getting stronger  Prognosis: Good  REQUIRES PT FOLLOW UP: Yes    Patient Education:  Patient Education: Continue with HEP and monitor response to progressions. Plan:  Times per week: 2 x per week  Plan weeks: 5 weeks  Specific instructions for Next Treatment: gentle DLSP in neutral position, LE strengthening, progress to standing and stretching with lumbar extension AROM, balance exercises  Current Treatment Recommendations: Strengthening, ROM, Balance Training, Gait Training, Home Exercise Program    Goals:  Patient goals : to get my back feeling better    Short term goals  Time Frame for Short term goals: deferred to LTG's    Long term goals  Time Frame for Long term goals : 5 weeks  Long term goal 1: increase AROM lumbar extension to 25%, R hip flexion to 50 degrees to allow patient to stand erect with walking with decreased LBP to 4/10 with walking x 5 minutes in house with RW  Long term goal 2: increase strength abdominal muscles to fair with 15 reps DLSP and R LE to 3+/5 to allow patient to go from sit to stand with decreased LBP and R LE pain to 4/10  Long term goal 3: I with HEP as prescribed to allow patient to report able to sleep x 4 hours without awakening due to LBP or R LE pain         Amaya David # [id number]

## 2018-02-26 ENCOUNTER — HOSPITAL ENCOUNTER (OUTPATIENT)
Dept: PHYSICAL THERAPY | Age: 83
Setting detail: THERAPIES SERIES
Discharge: HOME | End: 2018-02-26
Payer: MEDICARE

## 2018-02-26 PROCEDURE — 97110 THERAPEUTIC EXERCISES: CPT

## 2018-03-02 ENCOUNTER — HOSPITAL ENCOUNTER (OUTPATIENT)
Dept: PHYSICAL THERAPY | Age: 83
Setting detail: THERAPIES SERIES
Discharge: HOME | End: 2018-03-02
Payer: MEDICARE

## 2018-03-02 PROCEDURE — 97110 THERAPEUTIC EXERCISES: CPT

## 2018-03-05 ENCOUNTER — HOSPITAL ENCOUNTER (OUTPATIENT)
Dept: PHYSICAL THERAPY | Age: 83
Setting detail: THERAPIES SERIES
Discharge: HOME | End: 2018-03-05
Payer: MEDICARE

## 2018-03-05 PROCEDURE — 97110 THERAPEUTIC EXERCISES: CPT

## 2018-03-05 PROCEDURE — G8980 MOBILITY D/C STATUS: HCPCS

## 2018-03-05 PROCEDURE — G8979 MOBILITY GOAL STATUS: HCPCS

## 2018-03-05 NOTE — PROGRESS NOTES
degrees to allow patient to stand erect with walking with decreased LBP to 4/10 with walking x 5 minutes in house with RW. MET- AROM LUMBAR EXTENSION 25% IN STANDING, HIP FLEXION 52 DEGREES B  Long term goal 2: increase strength abdominal muscles to fair with 15 reps DLSP and R LE to 3+/5 to allow patient to go from sit to stand with decreased LBP and R LE pain to 4/10. MET WITH STRENGTH HIP 3+/5, KNEE 4-/5, ANKLE 4-/5 , PAIN HIGH 1/10 BUT 0/10 SIT TO STAND  Long term goal 3: I with HEP as prescribed to allow patient to report able to sleep x 4 hours without awakening due to LBP or R LE pain. MET    PT G-Codes  Functional Assessment Tool Used: AM-PAC BASIC MOBILITY  Score: 32  Functional Limitation: Mobility: Walking and moving around  Mobility: Walking and Moving Around Goal Status (): At least 60 percent but less than 80 percent impaired, limited or restricted  Mobility: Walking and Moving Around Discharge Status (): At least 60 percent but less than 80 percent impaired, limited or restricted    Corean Snowball.  Nixon Pop # [id number]

## 2018-03-12 ENCOUNTER — CARE COORDINATION (OUTPATIENT)
Dept: CARE COORDINATION | Age: 83
End: 2018-03-12

## 2018-03-12 NOTE — CARE COORDINATION
docusate sodium (COLACE) 250 MG capsule Take 250 mg by mouth daily.      Yes Historical Provider, MD       Future Appointments  Date Time Provider Steven Mathuri   4/30/2018 3:45 PM Nathan Giordano MD [facility] [place] [address]   5/4/2018 11:00 AM SCHEDULE, [facility] PACER NURSE [facility] PACER [facility] AM [address] [address]   5/4/2018 11:30 AM Troy Mcgill MD [address] [facility] AM [address]

## 2018-03-14 ENCOUNTER — HOSPITAL ENCOUNTER (OUTPATIENT)
Age: 83
Discharge: HOME | End: 2018-03-14
Payer: MEDICARE

## 2018-03-14 DIAGNOSIS — Z79.01 LONG-TERM (CURRENT) USE OF ANTICOAGULANTS: ICD-10-CM

## 2018-03-14 DIAGNOSIS — I82.A11 DVT OF AXILLARY VEIN, ACUTE RIGHT (HCC): ICD-10-CM

## 2018-03-14 LAB — INR BLD: 1.89 (ref 0.85–1.13)

## 2018-03-14 PROCEDURE — 85610 PROTHROMBIN TIME: CPT

## 2018-03-14 PROCEDURE — 36415 COLL VENOUS BLD VENIPUNCTURE: CPT

## 2018-03-15 ENCOUNTER — ANTI-COAG VISIT (OUTPATIENT)
Dept: FAMILY MEDICINE CLINIC | Age: 83
End: 2018-03-15
Payer: MEDICARE

## 2018-03-15 DIAGNOSIS — I82.A11 DVT OF AXILLARY VEIN, ACUTE RIGHT (HCC): ICD-10-CM

## 2018-03-15 DIAGNOSIS — I48.92 ATRIAL FLUTTER, PAROXYSMAL (HCC): ICD-10-CM

## 2018-03-15 PROCEDURE — 93793 ANTICOAG MGMT PT WARFARIN: CPT | Performed by: FAMILY MEDICINE

## 2018-03-15 NOTE — PROGRESS NOTES
INR is just under normal range. No dose change. Repeat in 1 month. Please advise patient.   Suzanne Hammonds MD

## 2018-04-11 ENCOUNTER — HOSPITAL ENCOUNTER (OUTPATIENT)
Age: 83
Discharge: HOME | End: 2018-04-11
Payer: MEDICARE

## 2018-04-11 DIAGNOSIS — Z79.01 LONG-TERM (CURRENT) USE OF ANTICOAGULANTS: ICD-10-CM

## 2018-04-11 DIAGNOSIS — I82.A11 DVT OF AXILLARY VEIN, ACUTE RIGHT (HCC): ICD-10-CM

## 2018-04-11 LAB — INR BLD: 1.93 (ref 0.85–1.13)

## 2018-04-11 PROCEDURE — 36415 COLL VENOUS BLD VENIPUNCTURE: CPT

## 2018-04-11 PROCEDURE — 85610 PROTHROMBIN TIME: CPT

## 2018-04-12 ENCOUNTER — CARE COORDINATION (OUTPATIENT)
Dept: CARE COORDINATION | Age: 83
End: 2018-04-12

## 2018-04-12 ENCOUNTER — ANTI-COAG VISIT (OUTPATIENT)
Dept: FAMILY MEDICINE CLINIC | Age: 83
End: 2018-04-12
Payer: MEDICARE

## 2018-04-12 DIAGNOSIS — I48.92 ATRIAL FLUTTER, PAROXYSMAL (HCC): ICD-10-CM

## 2018-04-12 DIAGNOSIS — I82.A11 DVT OF AXILLARY VEIN, ACUTE RIGHT (HCC): ICD-10-CM

## 2018-04-12 PROCEDURE — 93793 ANTICOAG MGMT PT WARFARIN: CPT | Performed by: FAMILY MEDICINE

## 2018-04-30 ENCOUNTER — OFFICE VISIT (OUTPATIENT)
Dept: FAMILY MEDICINE CLINIC | Age: 83
End: 2018-04-30
Payer: MEDICARE

## 2018-04-30 ENCOUNTER — HOSPITAL ENCOUNTER (OUTPATIENT)
Age: 83
Discharge: HOME | End: 2018-04-30
Payer: MEDICARE

## 2018-04-30 VITALS
DIASTOLIC BLOOD PRESSURE: 62 MMHG | HEIGHT: 63 IN | BODY MASS INDEX: 25.87 KG/M2 | SYSTOLIC BLOOD PRESSURE: 122 MMHG | WEIGHT: 146 LBS | HEART RATE: 68 BPM

## 2018-04-30 DIAGNOSIS — I25.10 CORONARY ARTERY DISEASE INVOLVING NATIVE CORONARY ARTERY OF NATIVE HEART WITHOUT ANGINA PECTORIS: ICD-10-CM

## 2018-04-30 DIAGNOSIS — I25.10 CORONARY ARTERY DISEASE INVOLVING NATIVE CORONARY ARTERY OF NATIVE HEART WITHOUT ANGINA PECTORIS: Primary | ICD-10-CM

## 2018-04-30 DIAGNOSIS — Z79.01 LONG-TERM (CURRENT) USE OF ANTICOAGULANTS: ICD-10-CM

## 2018-04-30 DIAGNOSIS — N18.30 CKD (CHRONIC KIDNEY DISEASE) STAGE 3, GFR 30-59 ML/MIN (HCC): ICD-10-CM

## 2018-04-30 DIAGNOSIS — R60.0 LEG EDEMA: ICD-10-CM

## 2018-04-30 DIAGNOSIS — I48.92 ATRIAL FLUTTER, PAROXYSMAL (HCC): ICD-10-CM

## 2018-04-30 DIAGNOSIS — F05 SUNDOWNING: ICD-10-CM

## 2018-04-30 DIAGNOSIS — I82.A11 DVT OF AXILLARY VEIN, ACUTE RIGHT (HCC): ICD-10-CM

## 2018-04-30 LAB
ALBUMIN SERPL-MCNC: 3.8 G/DL (ref 3.5–5.1)
ALP BLD-CCNC: 82 U/L (ref 38–126)
ALT SERPL-CCNC: 6 U/L (ref 11–66)
ANION GAP SERPL CALCULATED.3IONS-SCNC: 13 MEQ/L (ref 8–16)
AST SERPL-CCNC: 17 U/L (ref 5–40)
BILIRUB SERPL-MCNC: 0.3 MG/DL (ref 0.3–1.2)
BUN BLDV-MCNC: 36 MG/DL (ref 7–22)
CALCIUM SERPL-MCNC: 9.3 MG/DL (ref 8.5–10.5)
CHLORIDE BLD-SCNC: 99 MEQ/L (ref 98–111)
CO2: 28 MEQ/L (ref 23–33)
CREAT SERPL-MCNC: 1.7 MG/DL (ref 0.4–1.2)
GFR SERPL CREATININE-BSD FRML MDRD: 38 ML/MIN/1.73M2
GLUCOSE BLD-MCNC: 110 MG/DL (ref 70–108)
HCT VFR BLD CALC: 37.9 % (ref 42–52)
HEMOGLOBIN: 12.5 GM/DL (ref 14–18)
INR BLD: 1.89 (ref 0.85–1.13)
MCH RBC QN AUTO: 30 PG (ref 27–31)
MCHC RBC AUTO-ENTMCNC: 33.1 GM/DL (ref 33–37)
MCV RBC AUTO: 90.7 FL (ref 80–94)
PDW BLD-RTO: 14.7 % (ref 11.5–14.5)
PLATELET # BLD: 219 THOU/MM3 (ref 130–400)
PMV BLD AUTO: 9.2 FL (ref 7.4–10.4)
POTASSIUM SERPL-SCNC: 4.7 MEQ/L (ref 3.5–5.2)
RBC # BLD: 4.18 MILL/MM3 (ref 4.7–6.1)
SODIUM BLD-SCNC: 140 MEQ/L (ref 135–145)
TOTAL PROTEIN: 6.9 G/DL (ref 6.1–8)
WBC # BLD: 7.7 THOU/MM3 (ref 4.8–10.8)

## 2018-04-30 PROCEDURE — 80053 COMPREHEN METABOLIC PANEL: CPT

## 2018-04-30 PROCEDURE — 85027 COMPLETE CBC AUTOMATED: CPT

## 2018-04-30 PROCEDURE — 99214 OFFICE O/P EST MOD 30 MIN: CPT | Performed by: FAMILY MEDICINE

## 2018-04-30 PROCEDURE — 36415 COLL VENOUS BLD VENIPUNCTURE: CPT

## 2018-04-30 PROCEDURE — G8417 CALC BMI ABV UP PARAM F/U: HCPCS | Performed by: FAMILY MEDICINE

## 2018-04-30 PROCEDURE — 4040F PNEUMOC VAC/ADMIN/RCVD: CPT | Performed by: FAMILY MEDICINE

## 2018-04-30 PROCEDURE — G8598 ASA/ANTIPLAT THER USED: HCPCS | Performed by: FAMILY MEDICINE

## 2018-04-30 PROCEDURE — G8427 DOCREV CUR MEDS BY ELIG CLIN: HCPCS | Performed by: FAMILY MEDICINE

## 2018-04-30 PROCEDURE — 3288F FALL RISK ASSESSMENT DOCD: CPT | Performed by: FAMILY MEDICINE

## 2018-04-30 PROCEDURE — 1123F ACP DISCUSS/DSCN MKR DOCD: CPT | Performed by: FAMILY MEDICINE

## 2018-04-30 PROCEDURE — 1036F TOBACCO NON-USER: CPT | Performed by: FAMILY MEDICINE

## 2018-04-30 PROCEDURE — 85610 PROTHROMBIN TIME: CPT

## 2018-04-30 RX ORDER — PRAVASTATIN SODIUM 40 MG
40 TABLET ORAL EVERY EVENING
Qty: 90 TABLET | Refills: 1 | Status: SHIPPED | OUTPATIENT
Start: 2018-04-30 | End: 2018-01-01 | Stop reason: SDUPTHER

## 2018-04-30 RX ORDER — FUROSEMIDE 40 MG/1
40 TABLET ORAL DAILY
Qty: 30 TABLET | Refills: 5 | Status: SHIPPED | OUTPATIENT
Start: 2018-04-30 | End: 2018-01-01 | Stop reason: SDUPTHER

## 2018-04-30 RX ORDER — WARFARIN SODIUM 3 MG/1
3 TABLET ORAL DAILY
Qty: 90 TABLET | Refills: 1 | Status: SHIPPED | OUTPATIENT
Start: 2018-04-30 | End: 2018-01-01 | Stop reason: ALTCHOICE

## 2018-04-30 RX ORDER — ISOSORBIDE DINITRATE 10 MG/1
10 TABLET ORAL 3 TIMES DAILY
Qty: 90 TABLET | Refills: 5 | Status: SHIPPED | OUTPATIENT
Start: 2018-04-30 | End: 2018-01-01 | Stop reason: SDUPTHER

## 2018-04-30 RX ORDER — POTASSIUM CHLORIDE 600 MG/1
8 TABLET, FILM COATED, EXTENDED RELEASE ORAL 2 TIMES DAILY
Qty: 60 TABLET | Refills: 5 | Status: SHIPPED | OUTPATIENT
Start: 2018-04-30 | End: 2018-01-01 | Stop reason: SDUPTHER

## 2018-04-30 ASSESSMENT — ENCOUNTER SYMPTOMS
COUGH: 1
SHORTNESS OF BREATH: 0

## 2018-05-01 ENCOUNTER — ANTI-COAG VISIT (OUTPATIENT)
Dept: FAMILY MEDICINE CLINIC | Age: 83
End: 2018-05-01

## 2018-05-01 ENCOUNTER — TELEPHONE (OUTPATIENT)
Dept: FAMILY MEDICINE CLINIC | Age: 83
End: 2018-05-01

## 2018-05-01 DIAGNOSIS — I82.A11 DVT OF AXILLARY VEIN, ACUTE RIGHT (HCC): ICD-10-CM

## 2018-05-01 DIAGNOSIS — I48.92 ATRIAL FLUTTER, PAROXYSMAL (HCC): ICD-10-CM

## 2018-05-04 ENCOUNTER — NURSE ONLY (OUTPATIENT)
Dept: CARDIOLOGY CLINIC | Age: 83
End: 2018-05-04
Payer: MEDICARE

## 2018-05-04 ENCOUNTER — OFFICE VISIT (OUTPATIENT)
Dept: CARDIOLOGY CLINIC | Age: 83
End: 2018-05-04
Payer: MEDICARE

## 2018-05-04 VITALS
HEART RATE: 62 BPM | SYSTOLIC BLOOD PRESSURE: 112 MMHG | DIASTOLIC BLOOD PRESSURE: 64 MMHG | BODY MASS INDEX: 26.13 KG/M2 | WEIGHT: 147.5 LBS | HEIGHT: 63 IN

## 2018-05-04 DIAGNOSIS — I10 ESSENTIAL HYPERTENSION: ICD-10-CM

## 2018-05-04 DIAGNOSIS — Z95.0 PACEMAKER: Primary | ICD-10-CM

## 2018-05-04 DIAGNOSIS — I48.20 CHRONIC ATRIAL FIBRILLATION (HCC): ICD-10-CM

## 2018-05-04 DIAGNOSIS — E78.01 FAMILIAL HYPERCHOLESTEROLEMIA: ICD-10-CM

## 2018-05-04 PROCEDURE — 99213 OFFICE O/P EST LOW 20 MIN: CPT | Performed by: NUCLEAR MEDICINE

## 2018-05-04 PROCEDURE — G8417 CALC BMI ABV UP PARAM F/U: HCPCS | Performed by: NUCLEAR MEDICINE

## 2018-05-04 PROCEDURE — 93280 PM DEVICE PROGR EVAL DUAL: CPT | Performed by: INTERNAL MEDICINE

## 2018-05-04 PROCEDURE — 3288F FALL RISK ASSESSMENT DOCD: CPT | Performed by: NUCLEAR MEDICINE

## 2018-05-04 PROCEDURE — G8428 CUR MEDS NOT DOCUMENT: HCPCS | Performed by: NUCLEAR MEDICINE

## 2018-05-04 PROCEDURE — 1036F TOBACCO NON-USER: CPT | Performed by: NUCLEAR MEDICINE

## 2018-05-04 PROCEDURE — 1123F ACP DISCUSS/DSCN MKR DOCD: CPT | Performed by: NUCLEAR MEDICINE

## 2018-05-04 PROCEDURE — G8598 ASA/ANTIPLAT THER USED: HCPCS | Performed by: NUCLEAR MEDICINE

## 2018-05-04 PROCEDURE — 4040F PNEUMOC VAC/ADMIN/RCVD: CPT | Performed by: NUCLEAR MEDICINE

## 2018-05-16 ENCOUNTER — CARE COORDINATION (OUTPATIENT)
Dept: CARE COORDINATION | Age: 83
End: 2018-05-16

## 2018-05-16 ASSESSMENT — ENCOUNTER SYMPTOMS: DYSPNEA ASSOCIATED WITH: EXERTION

## 2018-06-11 ENCOUNTER — HOSPITAL ENCOUNTER (OUTPATIENT)
Age: 83
Discharge: HOME | End: 2018-06-11
Payer: MEDICARE

## 2018-06-11 DIAGNOSIS — Z79.01 LONG-TERM (CURRENT) USE OF ANTICOAGULANTS: ICD-10-CM

## 2018-06-11 DIAGNOSIS — I82.A11 DVT OF AXILLARY VEIN, ACUTE RIGHT (HCC): ICD-10-CM

## 2018-06-11 LAB — INR BLD: 1.67 (ref 0.85–1.13)

## 2018-06-11 PROCEDURE — 85610 PROTHROMBIN TIME: CPT

## 2018-06-11 PROCEDURE — 36415 COLL VENOUS BLD VENIPUNCTURE: CPT

## 2018-06-12 ENCOUNTER — ANTI-COAG VISIT (OUTPATIENT)
Dept: FAMILY MEDICINE CLINIC | Age: 83
End: 2018-06-12
Payer: MEDICARE

## 2018-06-12 DIAGNOSIS — I48.92 ATRIAL FLUTTER, PAROXYSMAL (HCC): ICD-10-CM

## 2018-06-12 DIAGNOSIS — I82.A11 DVT OF AXILLARY VEIN, ACUTE RIGHT (HCC): ICD-10-CM

## 2018-06-12 PROCEDURE — 93793 ANTICOAG MGMT PT WARFARIN: CPT | Performed by: FAMILY MEDICINE

## 2018-06-20 ENCOUNTER — CARE COORDINATION (OUTPATIENT)
Dept: CARE COORDINATION | Age: 83
End: 2018-06-20

## 2018-07-12 NOTE — CARE COORDINATION
Scheduled office visit 7/17/18 for face to face for roseann. Discussed hydroxyxine was sent to [facility]. Discussed taking medication in evening when more confusion and agitated. Advised not getting on mower when taking hydroxyxine. Verbalizes understanding. Spoke with YONIS at [facility]. Needs referral order with therapy evaluation for roseann, facesheet, faxed to [phone].

## 2018-07-12 NOTE — CARE COORDINATION
Ambulatory Care Coordination Note  7/12/2018  CM Risk Score: 4  Marcel Mortality Risk Score: 40.14    ACC: Nadine Pastor, MAYNOR    Summary Note: Call from daughter, Juvenal Waite (HIPPA). Reports dementia worsening and sundowners. Early evening increase confusion, agitation, anxiety, and upset. States he likes to do \"find a word\" puzzles and cannot focus on them especially at that time of day. Asking for hydroxyxine as taken in past while in [place]. Reports he fell off mower this week and scraped his arm on a screw. States she knows he isn't safe on mower but insists on using it daily to get the mail. Discussed safety. Discussed a scooter or power chair may be safer. Interested in a scooter. Can contact [facility]. Note routed to PCP. Care Coordination Interventions    Program Enrollment:  Complex Care  Referral from Primary Care Provider:  Yes  Suggested Interventions and Community Resources  Physical Therapy:  Completed  Zone Management Tools:  Completed (Comment: mailed COPD and CHF 9/5/17)         Goals Addressed             Most Recent     Conditions and Symptoms   On track (7/12/2018)             I will schedule office visits, as directed by my provider. I will keep my appointment or reschedule if I have to cancel. I will notify my provider of any barriers to my plan of care. I will follow my Zone Management tool to seek urgent or emergent care. I will notify my provider of any symptoms that indicate a worsening of my condition. Barriers: none  Plan for overcoming my barriers: N/A  Confidence: 7/10  Anticipated Goal Completion Date: 7/26/17         Nutrition Plan   On track (7/12/2018)             I will follow a nutritional plan as directed   Low Sodium:  2g    Barriers: none  Plan for overcoming my barriers: N/A  Confidence: 7/10  Anticipated Goal Completion Date: 7/26/17            Prior to Admission medications    Medication Sig Start Date End Date Taking?  Authorizing Provider   metoprolol tartrate (LOPRESSOR) 25 MG tablet Take 0.5 tablets by mouth 2 times daily 4/30/18  Yes Dilip Major MD   isosorbide dinitrate (ISORDIL) 10 MG tablet Take 1 tablet by mouth 3 times daily 4/30/18  Yes Dilip Major MD   furosemide (LASIX) 40 MG tablet Take 1 tablet by mouth daily One po q day prn leg edema 4/30/18  Yes Dilip Major MD   warfarin (COUMADIN) 3 MG tablet Take 1 tablet by mouth daily 4/30/18  Yes Dilip Major MD   potassium chloride (KLOR-CON) 8 MEQ extended release tablet Take 1 tablet by mouth 2 times daily 4/30/18  Yes Dilip Major MD   pravastatin (PRAVACHOL) 40 MG tablet Take 1 tablet by mouth every evening 4/30/18  Yes Dilip Major MD   Acetaminophen (TYLENOL ARTHRITIS PAIN PO) Take by mouth   Yes Historical Provider, MD   Multiple Vitamins-Minerals (PRESERVISION AREDS) CAPS Take by mouth   Yes Historical Provider, MD   senna (SENOKOT) 8.6 MG tablet Take 1 tablet by mouth nightly   Yes Historical Provider, MD   docusate sodium (COLACE) 250 MG capsule Take 250 mg by mouth daily.      Yes Historical Provider, MD       Future Appointments  Date Time Provider Steven Ml   10/29/2018 3:30 PM MD SAI Alves [address]   11/8/2018 11:00 AM SCHEDULE, [facility] PACER NURSE [facility]I [facility]   5/3/2019 11:45 AM Brennan Redman MD [facility] [address]

## 2018-07-12 NOTE — CARE COORDINATION
Recommend appt in office to order PT and eval need for scooter. Please advise patient.   Mg Brandt MD

## 2018-07-19 NOTE — PROGRESS NOTES
[facility] PROFESSIONAL SERVS  [facility]  [address] [address]  Dept: [phone]  Dept Fax: [phone]  Loc: [phone]  PROGRESS NOTE      Visit Date: 7/19/2018    Janeth Womack is a 80 y.o. male who presents today for:  Chief Complaint   Patient presents with    Check-Up     face to face mortorized scooter       Subjective:  HPI      Limited mobility due osteoarthritis and back. He uses a wheeled walker with seat to get around inside the house. He is unable to use his arms to self propel himself. 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 Daughter is present    There is a list of questions on paper from patient's wife:  1. It states he can hardly walk without his walker and that he does his leg exercises. 2.  He gets hot spells usually after eating a meal but most of the time he is cold  3. He is in a lot of pain most of the time, he takes Tylenol when he thinks about it.  he needs something stronger? Tramadol? 4. His cardiologist would like him to get off the Coumadin as he says he has been on it long enough. 5. He has a lot of pain in his back, neck, leg, shoulders, and fingers. Is he too old for carpal tunnel? He had surgery once. 6.  He gets SOB with walking. Review of Systems   Constitutional: Negative for chills and fever. Respiratory: Positive for shortness of breath. Negative for wheezing. Cardiovascular: Negative for chest pain. Musculoskeletal: Positive for arthralgias, back pain and gait problem.      Past Medical History:   Diagnosis Date    Arthritis     Atrial flutter, paroxysmal (HCC)     BPH (benign prostatic hyperplasia)     CAD (coronary artery disease)     Status post bypass    [facility]     CHF (congestive heart failure) (HCC)     Chronic kidney disease     Dyslipidemia     Hyperlipidemia     Movement disorder     Sick sinus syndrome ([address]     pace maker    Small bowel obstruction       Past Surgical History:   Procedure Laterality Date    CHOLECYSTECTOMY  11/15/2004    Dr Adams Colón  9/04/2009    Dr Melissa Barrera    ENDOSCOPY, COLON, DIAGNOSTIC      EYE SURGERY      JOINT REPLACEMENT      both hips    PACEMAKER INSERTION  2012    SKIN CANCER EXCISION      UPPER GASTROINTESTINAL ENDOSCOPY  10/05/2007    Dr Prudencio Abrams     Family History   Problem Relation Age of Onset    Early Death Mother     Heart Disease Brother     Cancer Brother     Heart Disease Father     Cancer Sister     Arthritis Paternal Aunt     Other Paternal Aunt      Social History   Substance Use Topics    Smoking status: Former Smoker     Types: Cigarettes     Quit date: 4/1/1993    Smokeless tobacco: Former User    Alcohol use No      Current Outpatient Prescriptions   Medication Sig Dispense Refill    hydrOXYzine (ATARAX) 25 MG tablet Take 1 tablet by mouth 2 times daily as needed for Itching 60 tablet 0    metoprolol tartrate (LOPRESSOR) 25 MG tablet Take 0.5 tablets by mouth 2 times daily 30 tablet 5    isosorbide dinitrate (ISORDIL) 10 MG tablet Take 1 tablet by mouth 3 times daily 90 tablet 5    furosemide (LASIX) 40 MG tablet Take 1 tablet by mouth daily One po q day prn leg edema 30 tablet 5    warfarin (COUMADIN) 3 MG tablet Take 1 tablet by mouth daily 90 tablet 1    potassium chloride (KLOR-CON) 8 MEQ extended release tablet Take 1 tablet by mouth 2 times daily 60 tablet 5    pravastatin (PRAVACHOL) 40 MG tablet Take 1 tablet by mouth every evening 90 tablet 1    Acetaminophen (TYLENOL ARTHRITIS PAIN PO) Take by mouth      Multiple Vitamins-Minerals (PRESERVISION AREDS) CAPS Take by mouth      senna (SENOKOT) 8.6 MG tablet Take 1 tablet by mouth nightly      docusate sodium (COLACE) 250 MG capsule Take 250 mg by mouth daily.          No current facility-administered medications for this External Referral To Occupational Therapy    4. Limited mobility  As above  - External Referral To Physical Therapy  - External Referral To Occupational Therapy    5. Sciatica of right side  Chronic pain with intermittent sciatica pain. Will do burst of oral steroids. - predniSONE (DELTASONE) 20 MG tablet; Take 1 tablet by mouth daily  Dispense: 7 tablet; Refill: 0      -A total of at least 25 minutes was spent face-to-face with the patient during this encounter and over 50% of that time was spent on counseling and/or coordination of care. The patient's conditions were thoroughly discussed during the visit today and all questions were answered. They voiced understanding. All questions answered. They agreed with treatment plan. See patient instructions for any educational materials that may have been given. Discussed use, benefit, and side effects of prescribed medications. Reviewed health maintenance. (Please note that portions of this note may have been completed with a voice recognition program.  Efforts were made to edit the dictation but occasionally words are mis-transcribed.)    Return if symptoms worsen or fail to improve.       Electronically signed by Raiza Thomas MD on 7/19/2018 at 1:41 PM

## 2018-08-02 NOTE — CARE COORDINATION
Left message to return phone call to care coordinator at [phone] regarding PT/OT evaluations for scooter. Faxed information to [facility] Including facesheet, office notes, orders for PT/OT for scooter. Fax confirmed. Message from [facility] stating they decided to cancel the scooter process. She called [facility] and [facility]. States after family discussed they opted for a golf cart as this would be better for hauling yard tools around. Encouraged to call with any problems, questions, concerns.

## 2018-10-19 NOTE — PROGRESS NOTES
INR is slightly low. Take an extra 1/2 tab today and resume 3 mg daily. Recheck in 1 month. Please advise patient.   Courtney Manuel MD

## 2018-10-29 NOTE — PROGRESS NOTES
Pneumococcal low/med risk (2 of 2 - PCV13) 01/11/2017    Flu vaccine (1) 09/01/2018    Potassium monitoring  04/30/2019    Creatinine monitoring  04/30/2019       Objective:  /70 (Site: Left Upper Arm, Position: Sitting, Cuff Size: Medium Adult)   Pulse 76   Ht 5' 3\" (1.6 m)   Wt 146 lb (66.2 kg)   BMI 25.86 kg/m²   Physical Exam   Constitutional: He is oriented to person, place, and time. He appears well-developed and well-nourished. Cardiovascular: Normal rate and regular rhythm. Pulmonary/Chest: Effort normal and breath sounds normal. No respiratory distress. He has no wheezes. He has no rales. Neurological: He is alert and oriented to person, place, and time. Psychiatric: He has a normal mood and affect. He is withdrawn. Cognition and memory are impaired. Vitals reviewed. Impression/Plan:  1. Coronary artery disease involving native coronary artery of native heart without angina pectoris  Well-controlled. Chronic condition. Refill medication(s). - metoprolol tartrate (LOPRESSOR) 25 MG tablet; Take 0.5 tablets by mouth 2 times daily  Dispense: 30 tablet; Refill: 5  - isosorbide dinitrate (ISORDIL) 10 MG tablet; Take 1 tablet by mouth 3 times daily  Dispense: 90 tablet; Refill: 5  - pravastatin (PRAVACHOL) 40 MG tablet; Take 1 tablet by mouth every evening  Dispense: 90 tablet; Refill: 1  - aspirin EC 81 MG EC tablet; Take 1 tablet by mouth daily  Dispense: 30 tablet; Refill: 3    2. Leg edema  Well-controlled. Chronic condition. Refill medication(s). - furosemide (LASIX) 40 MG tablet; Take 1 tablet by mouth daily One po q day prn leg edema  Dispense: 30 tablet; Refill: 5  - potassium chloride (KLOR-CON) 8 MEQ extended release tablet; Take 1 tablet by mouth 2 times daily  Dispense: 60 tablet; Refill: 5    3. Essential hypertension  Well-controlled. Chronic condition. Refill medication(s). - CBC; Future  - Comprehensive Metabolic Panel; Future    4.  [facility] fl[facility]ox[facility]mal ([facility]  Chronic. Stable. Stop coumadin due to high fall risk and multiple falls in past few months. 5. CKD (chronic kidney disease) stage 3, GFR 30-59 ml/min ([facility])  Chronic. Stable. Monitor.   - Comprehensive Metabolic Panel; Future    6. Dementia without behavioral disturbance, unspecified dementia type  Worsening. Chronic condition. Discussed assisted living with daughter. He requires assistance with ADLs. 7. Influenza vaccine needed  - INFLUENZA, HIGH DOSE, 65 YRS +, IM, PF, PREFILL SYR, 0.5ML (FLUZONE HD)    They voiced understanding. All questions answered. They agreed with treatment plan. Educational materials given - see patient instructions. Discussed use, benefit, and side effects of prescribed medications. Reviewed health maintenance. Return in about 3 months (around 1/29/2019) for dementia.       Electronically signed by Aashish Chan MD on 10/29/2018 at 3:36 PM

## 2018-10-29 NOTE — PROGRESS NOTES
Immunization(s) given during visit:    Immunizations     Name Date Dose Route    Influenza, High Dose (Fluzone 65 yrs and older) 10/29/2018 0.5 mL Intramuscular    Site: Deltoid- Left    Lot: [id number]    NDC: 18603-095-45

## 2018-10-30 NOTE — TELEPHONE ENCOUNTER
----- Message from Cherie Nageotte, MD sent at 10/30/2018  8:24 AM EDT -----  Kidney function is at baseline. CBC shows mild anemia. Please advise patient.   Cherie Nageotte, MD

## 2018-11-05 NOTE — TELEPHONE ENCOUNTER
Anjelica wife called to see if Diana Dominique is supposed to still be taking the potassium chloride, since he is not taking the warfarin anymore. Please call [facility] at [phone].

## 2018-11-19 NOTE — CARE COORDINATION
Left message to return phone call to care coordinator at [phone].
dinitrate (ISORDIL) 10 MG tablet Take 1 tablet by mouth 3 times daily 10/29/18  Yes Ayleen Ashley MD   furosemide (LASIX) 40 MG tablet Take 1 tablet by mouth daily One po q day prn leg edema 10/29/18  Yes Ayleen Ashley MD   potassium chloride (KLOR-CON) 8 MEQ extended release tablet Take 1 tablet by mouth 2 times daily 10/29/18  Yes Ayleen Ashley MD   pravastatin (PRAVACHOL) 40 MG tablet Take 1 tablet by mouth every evening 10/29/18  Yes Ayleen Ahsley MD   aspirin EC 81 MG EC tablet Take 1 tablet by mouth daily 10/29/18  Yes Ayleen Ashley MD   Multiple Vitamins-Minerals (PRESERVISION AREDS) CAPS Take by mouth   Yes Historical Provider, MD   senna (SENOKOT) 8.6 MG tablet Take 1 tablet by mouth nightly   Yes Historical Provider, MD   docusate sodium (COLACE) 250 MG capsule Take 250 mg by mouth daily.      Yes Historical Provider, MD   Acetaminophen (TYLENOL ARTHRITIS PAIN PO) Take by mouth    Historical Provider, MD       Future Appointments  Date Time Provider Steven Bull   11/29/2018 1:00 PM SCHEDULE, SRPS PACER NURSE [facility] [email or web address]   2/4/2019 3:30 PM MD SAI Bosch [facility] [email or web address]   4/29/2019 3:30 PM MD SAI Bosch DEL[place] [facility] [place]   5/3/2019 11:45 AM Jose Curtis MD [address] [facility] [email or web address]

## 2019-01-01 ENCOUNTER — CARE COORDINATION (OUTPATIENT)
Dept: CARE COORDINATION | Age: 84
End: 2019-01-01

## 2019-01-01 ENCOUNTER — APPOINTMENT (OUTPATIENT)
Dept: CT IMAGING | Age: 84
DRG: 065 | End: 2019-01-01
Payer: MEDICARE

## 2019-01-01 ENCOUNTER — HOSPITAL ENCOUNTER (INPATIENT)
Age: 84
LOS: 2 days | DRG: 072 | End: 2019-06-24
Attending: EMERGENCY MEDICINE | Admitting: FAMILY MEDICINE
Payer: MEDICARE

## 2019-01-01 ENCOUNTER — TELEPHONE (OUTPATIENT)
Dept: CARDIOLOGY CLINIC | Age: 84
End: 2019-01-01

## 2019-01-01 ENCOUNTER — OUTSIDE SERVICES (OUTPATIENT)
Dept: FAMILY MEDICINE CLINIC | Age: 84
End: 2019-01-01
Payer: MEDICARE

## 2019-01-01 ENCOUNTER — OFFICE VISIT (OUTPATIENT)
Dept: FAMILY MEDICINE CLINIC | Age: 84
End: 2019-01-01
Payer: MEDICARE

## 2019-01-01 ENCOUNTER — TELEPHONE (OUTPATIENT)
Dept: FAMILY MEDICINE CLINIC | Age: 84
End: 2019-01-01

## 2019-01-01 ENCOUNTER — HOSPITAL ENCOUNTER (INPATIENT)
Age: 84
LOS: 4 days | Discharge: NURSING, REHAB OR LONG-TERM CARE | DRG: 065 | End: 2019-05-20
Attending: EMERGENCY MEDICINE | Admitting: INTERNAL MEDICINE
Payer: MEDICARE

## 2019-01-01 VITALS
OXYGEN SATURATION: 94 % | HEART RATE: 73 BPM | DIASTOLIC BLOOD PRESSURE: 65 MMHG | TEMPERATURE: 97.7 F | WEIGHT: 143 LBS | HEIGHT: 66 IN | BODY MASS INDEX: 22.98 KG/M2 | SYSTOLIC BLOOD PRESSURE: 128 MMHG | RESPIRATION RATE: 18 BRPM

## 2019-01-01 VITALS
BODY MASS INDEX: 21.26 KG/M2 | HEART RATE: 107 BPM | DIASTOLIC BLOOD PRESSURE: 54 MMHG | RESPIRATION RATE: 10 BRPM | SYSTOLIC BLOOD PRESSURE: 129 MMHG | WEIGHT: 120 LBS | TEMPERATURE: 97.8 F | OXYGEN SATURATION: 95 %

## 2019-01-01 VITALS
WEIGHT: 144 LBS | DIASTOLIC BLOOD PRESSURE: 72 MMHG | HEART RATE: 84 BPM | SYSTOLIC BLOOD PRESSURE: 110 MMHG | BODY MASS INDEX: 25.52 KG/M2 | HEIGHT: 63 IN

## 2019-01-01 VITALS
WEIGHT: 143.4 LBS | HEIGHT: 63 IN | HEART RATE: 68 BPM | SYSTOLIC BLOOD PRESSURE: 110 MMHG | DIASTOLIC BLOOD PRESSURE: 46 MMHG | BODY MASS INDEX: 25.41 KG/M2

## 2019-01-01 VITALS
HEIGHT: 63 IN | RESPIRATION RATE: 20 BRPM | OXYGEN SATURATION: 90 % | HEART RATE: 54 BPM | WEIGHT: 139 LBS | DIASTOLIC BLOOD PRESSURE: 68 MMHG | TEMPERATURE: 98 F | SYSTOLIC BLOOD PRESSURE: 122 MMHG | BODY MASS INDEX: 24.63 KG/M2

## 2019-01-01 DIAGNOSIS — J43.1 PANLOBULAR EMPHYSEMA (HCC): ICD-10-CM

## 2019-01-01 DIAGNOSIS — G30.1 LATE ONSET ALZHEIMER'S DISEASE WITH BEHAVIORAL DISTURBANCE (HCC): Primary | ICD-10-CM

## 2019-01-01 DIAGNOSIS — I82.A11 DVT OF AXILLARY VEIN, ACUTE RIGHT (HCC): ICD-10-CM

## 2019-01-01 DIAGNOSIS — F02.818 LATE ONSET ALZHEIMER'S DISEASE WITH BEHAVIORAL DISTURBANCE (HCC): ICD-10-CM

## 2019-01-01 DIAGNOSIS — I25.10 CORONARY ARTERY DISEASE INVOLVING NATIVE CORONARY ARTERY OF NATIVE HEART WITHOUT ANGINA PECTORIS: ICD-10-CM

## 2019-01-01 DIAGNOSIS — I10 ESSENTIAL HYPERTENSION: ICD-10-CM

## 2019-01-01 DIAGNOSIS — Z91.81 AT HIGH RISK FOR FALLS: ICD-10-CM

## 2019-01-01 DIAGNOSIS — I50.30 CHF WITH LEFT VENTRICULAR DIASTOLIC DYSFUNCTION, NYHA CLASS 2 (HCC): ICD-10-CM

## 2019-01-01 DIAGNOSIS — Z74.09 LIMITED MOBILITY: ICD-10-CM

## 2019-01-01 DIAGNOSIS — I48.92 ATRIAL FLUTTER, PAROXYSMAL (HCC): ICD-10-CM

## 2019-01-01 DIAGNOSIS — G30.1 LATE ONSET ALZHEIMER'S DISEASE WITH BEHAVIORAL DISTURBANCE (HCC): ICD-10-CM

## 2019-01-01 DIAGNOSIS — F02.818 LATE ONSET ALZHEIMER'S DISEASE WITH BEHAVIORAL DISTURBANCE (HCC): Primary | ICD-10-CM

## 2019-01-01 DIAGNOSIS — M15.9 PRIMARY OSTEOARTHRITIS INVOLVING MULTIPLE JOINTS: ICD-10-CM

## 2019-01-01 DIAGNOSIS — N18.30 CHRONIC KIDNEY DISEASE, STAGE III (MODERATE) (HCC): ICD-10-CM

## 2019-01-01 DIAGNOSIS — G45.9 TIA (TRANSIENT ISCHEMIC ATTACK): ICD-10-CM

## 2019-01-01 DIAGNOSIS — R53.81 PHYSICAL DECONDITIONING: ICD-10-CM

## 2019-01-01 DIAGNOSIS — G30.1 LATE ONSET ALZHEIMER'S DISEASE WITHOUT BEHAVIORAL DISTURBANCE (HCC): Primary | ICD-10-CM

## 2019-01-01 DIAGNOSIS — I63.511 ACUTE RIGHT MCA STROKE (HCC): Primary | ICD-10-CM

## 2019-01-01 DIAGNOSIS — Z95.1 S/P CABG X 5: ICD-10-CM

## 2019-01-01 DIAGNOSIS — F02.80 LATE ONSET ALZHEIMER'S DISEASE WITHOUT BEHAVIORAL DISTURBANCE (HCC): Primary | ICD-10-CM

## 2019-01-01 DIAGNOSIS — R45.86 MOOD DISTURBANCE: ICD-10-CM

## 2019-01-01 DIAGNOSIS — I63.89 CEREBROVASCULAR ACCIDENT (CVA) DUE TO OTHER MECHANISM (HCC): Primary | ICD-10-CM

## 2019-01-01 DIAGNOSIS — R40.0 SOMNOLENCE: Primary | ICD-10-CM

## 2019-01-01 DIAGNOSIS — R41.89 UNRESPONSIVE STATE: ICD-10-CM

## 2019-01-01 DIAGNOSIS — R68.2 DRY MOUTH: ICD-10-CM

## 2019-01-01 LAB
ALBUMIN SERPL-MCNC: 3.4 G/DL (ref 3.5–5.1)
ALP BLD-CCNC: 80 U/L (ref 38–126)
ALT SERPL-CCNC: < 5 U/L (ref 11–66)
ANION GAP SERPL CALCULATED.3IONS-SCNC: 11 MEQ/L (ref 8–16)
ANION GAP SERPL CALCULATED.3IONS-SCNC: 11 MEQ/L (ref 8–16)
ANION GAP SERPL CALCULATED.3IONS-SCNC: 12 MEQ/L (ref 8–16)
ANION GAP SERPL CALCULATED.3IONS-SCNC: 12 MEQ/L (ref 8–16)
ANION GAP SERPL CALCULATED.3IONS-SCNC: 14 MEQ/L (ref 8–16)
APTT: 33.4 SECONDS (ref 22–38)
AST SERPL-CCNC: 15 U/L (ref 5–40)
BASOPHILS # BLD: 0.5 %
BASOPHILS # BLD: 0.5 %
BASOPHILS # BLD: 0.7 %
BASOPHILS ABSOLUTE: 0 THOU/MM3 (ref 0–0.1)
BILIRUB SERPL-MCNC: 0.4 MG/DL (ref 0.3–1.2)
BUN BLDV-MCNC: 23 MG/DL (ref 7–22)
BUN BLDV-MCNC: 30 MG/DL (ref 7–22)
BUN BLDV-MCNC: 34 MG/DL (ref 7–22)
BUN BLDV-MCNC: 35 MG/DL (ref 7–22)
BUN BLDV-MCNC: 38 MG/DL (ref 7–22)
CALCIUM SERPL-MCNC: 8.8 MG/DL (ref 8.5–10.5)
CALCIUM SERPL-MCNC: 8.8 MG/DL (ref 8.5–10.5)
CALCIUM SERPL-MCNC: 8.9 MG/DL (ref 8.5–10.5)
CALCIUM SERPL-MCNC: 9.1 MG/DL (ref 8.5–10.5)
CALCIUM SERPL-MCNC: 9.1 MG/DL (ref 8.5–10.5)
CHLORIDE BLD-SCNC: 100 MEQ/L (ref 98–111)
CHLORIDE BLD-SCNC: 101 MEQ/L (ref 98–111)
CHLORIDE BLD-SCNC: 102 MEQ/L (ref 98–111)
CHLORIDE BLD-SCNC: 103 MEQ/L (ref 98–111)
CHLORIDE BLD-SCNC: 104 MEQ/L (ref 98–111)
CHOLESTEROL, TOTAL: 133 MG/DL (ref 100–199)
CO2: 26 MEQ/L (ref 23–33)
CO2: 29 MEQ/L (ref 23–33)
CO2: 30 MEQ/L (ref 23–33)
CREAT SERPL-MCNC: 1.4 MG/DL (ref 0.4–1.2)
CREAT SERPL-MCNC: 1.6 MG/DL (ref 0.4–1.2)
CREAT SERPL-MCNC: 1.7 MG/DL (ref 0.4–1.2)
CREAT SERPL-MCNC: 1.7 MG/DL (ref 0.4–1.2)
CREAT SERPL-MCNC: 1.8 MG/DL (ref 0.4–1.2)
EKG ATRIAL RATE: 59 BPM
EKG ATRIAL RATE: 97 BPM
EKG Q-T INTERVAL: 434 MS
EKG Q-T INTERVAL: 438 MS
EKG QRS DURATION: 152 MS
EKG QRS DURATION: 154 MS
EKG QTC CALCULATION (BAZETT): 505 MS
EKG QTC CALCULATION (BAZETT): 542 MS
EKG R AXIS: -80 DEGREES
EKG R AXIS: -86 DEGREES
EKG T AXIS: 43 DEGREES
EKG T AXIS: 63 DEGREES
EKG VENTRICULAR RATE: 80 BPM
EKG VENTRICULAR RATE: 94 BPM
EOSINOPHIL # BLD: 0.9 %
EOSINOPHIL # BLD: 1.6 %
EOSINOPHIL # BLD: 1.7 %
EOSINOPHILS ABSOLUTE: 0.1 THOU/MM3 (ref 0–0.4)
ERYTHROCYTE [DISTWIDTH] IN BLOOD BY AUTOMATED COUNT: 13.9 % (ref 11.5–14.5)
ERYTHROCYTE [DISTWIDTH] IN BLOOD BY AUTOMATED COUNT: 14 % (ref 11.5–14.5)
ERYTHROCYTE [DISTWIDTH] IN BLOOD BY AUTOMATED COUNT: 14.1 % (ref 11.5–14.5)
ERYTHROCYTE [DISTWIDTH] IN BLOOD BY AUTOMATED COUNT: 47.5 FL (ref 35–45)
ERYTHROCYTE [DISTWIDTH] IN BLOOD BY AUTOMATED COUNT: 47.8 FL (ref 35–45)
ERYTHROCYTE [DISTWIDTH] IN BLOOD BY AUTOMATED COUNT: 48.9 FL (ref 35–45)
GFR SERPL CREATININE-BSD FRML MDRD: 35 ML/MIN/1.73M2
GFR SERPL CREATININE-BSD FRML MDRD: 38 ML/MIN/1.73M2
GFR SERPL CREATININE-BSD FRML MDRD: 38 ML/MIN/1.73M2
GFR SERPL CREATININE-BSD FRML MDRD: 40 ML/MIN/1.73M2
GFR SERPL CREATININE-BSD FRML MDRD: 47 ML/MIN/1.73M2
GLUCOSE BLD-MCNC: 88 MG/DL (ref 70–108)
GLUCOSE BLD-MCNC: 89 MG/DL (ref 70–108)
GLUCOSE BLD-MCNC: 92 MG/DL (ref 70–108)
GLUCOSE BLD-MCNC: 93 MG/DL (ref 70–108)
GLUCOSE BLD-MCNC: 94 MG/DL (ref 70–108)
GLUCOSE BLD-MCNC: 95 MG/DL (ref 70–108)
HCT VFR BLD CALC: 35.1 % (ref 42–52)
HCT VFR BLD CALC: 35.2 % (ref 42–52)
HCT VFR BLD CALC: 37.4 % (ref 42–52)
HDLC SERPL-MCNC: 52 MG/DL
HEMOGLOBIN: 10.9 GM/DL (ref 14–18)
HEMOGLOBIN: 11 GM/DL (ref 14–18)
HEMOGLOBIN: 11.2 GM/DL (ref 14–18)
IMMATURE GRANS (ABS): 0.01 THOU/MM3 (ref 0–0.07)
IMMATURE GRANS (ABS): 0.02 THOU/MM3 (ref 0–0.07)
IMMATURE GRANS (ABS): 0.02 THOU/MM3 (ref 0–0.07)
IMMATURE GRANULOCYTES: 0.2 %
IMMATURE GRANULOCYTES: 0.3 %
IMMATURE GRANULOCYTES: 0.3 %
INR BLD: 1.07 (ref 0.85–1.13)
LDL CHOLESTEROL CALCULATED: 67 MG/DL
LYMPHOCYTES # BLD: 17 %
LYMPHOCYTES # BLD: 21.7 %
LYMPHOCYTES # BLD: 24.6 %
LYMPHOCYTES ABSOLUTE: 1 THOU/MM3 (ref 1–4.8)
LYMPHOCYTES ABSOLUTE: 1.5 THOU/MM3 (ref 1–4.8)
LYMPHOCYTES ABSOLUTE: 1.5 THOU/MM3 (ref 1–4.8)
MAGNESIUM: 2.5 MG/DL (ref 1.6–2.4)
MCH RBC QN AUTO: 28.4 PG (ref 26–33)
MCH RBC QN AUTO: 29 PG (ref 26–33)
MCH RBC QN AUTO: 29.4 PG (ref 26–33)
MCHC RBC AUTO-ENTMCNC: 29.9 GM/DL (ref 32.2–35.5)
MCHC RBC AUTO-ENTMCNC: 31 GM/DL (ref 32.2–35.5)
MCHC RBC AUTO-ENTMCNC: 31.3 GM/DL (ref 32.2–35.5)
MCV RBC AUTO: 93.6 FL (ref 80–94)
MCV RBC AUTO: 93.9 FL (ref 80–94)
MCV RBC AUTO: 94.9 FL (ref 80–94)
MONOCYTES # BLD: 8.2 %
MONOCYTES # BLD: 8.8 %
MONOCYTES # BLD: 9.2 %
MONOCYTES ABSOLUTE: 0.5 THOU/MM3 (ref 0.4–1.3)
MONOCYTES ABSOLUTE: 0.6 THOU/MM3 (ref 0.4–1.3)
MONOCYTES ABSOLUTE: 0.6 THOU/MM3 (ref 0.4–1.3)
NUCLEATED RED BLOOD CELLS: 0 /100 WBC
OSMOLALITY CALCULATION: 286.6 MOSMOL/KG (ref 275–300)
OSMOLALITY CALCULATION: 290.1 MOSMOL/KG (ref 275–300)
PLATELET # BLD: 201 THOU/MM3 (ref 130–400)
PLATELET # BLD: 230 THOU/MM3 (ref 130–400)
PLATELET # BLD: 238 THOU/MM3 (ref 130–400)
PMV BLD AUTO: 9.7 FL (ref 9.4–12.4)
PMV BLD AUTO: 9.7 FL (ref 9.4–12.4)
PMV BLD AUTO: 9.8 FL (ref 9.4–12.4)
POTASSIUM REFLEX MAGNESIUM: 4.1 MEQ/L (ref 3.5–5.2)
POTASSIUM REFLEX MAGNESIUM: 4.4 MEQ/L (ref 3.5–5.2)
POTASSIUM REFLEX MAGNESIUM: 4.5 MEQ/L (ref 3.5–5.2)
POTASSIUM SERPL-SCNC: 4 MEQ/L (ref 3.5–5.2)
POTASSIUM SERPL-SCNC: 4.1 MEQ/L (ref 3.5–5.2)
RBC # BLD: 3.74 MILL/MM3 (ref 4.7–6.1)
RBC # BLD: 3.76 MILL/MM3 (ref 4.7–6.1)
RBC # BLD: 3.94 MILL/MM3 (ref 4.7–6.1)
SEG NEUTROPHILS: 64.3 %
SEG NEUTROPHILS: 67.4 %
SEG NEUTROPHILS: 72.1 %
SEGMENTED NEUTROPHILS ABSOLUTE COUNT: 4.1 THOU/MM3 (ref 1.8–7.7)
SEGMENTED NEUTROPHILS ABSOLUTE COUNT: 4.3 THOU/MM3 (ref 1.8–7.7)
SEGMENTED NEUTROPHILS ABSOLUTE COUNT: 4.5 THOU/MM3 (ref 1.8–7.7)
SODIUM BLD-SCNC: 141 MEQ/L (ref 135–145)
SODIUM BLD-SCNC: 142 MEQ/L (ref 135–145)
SODIUM BLD-SCNC: 142 MEQ/L (ref 135–145)
SODIUM URINE: 81 MEQ/L
T4 FREE: 1.14 NG/DL (ref 0.93–1.76)
TOTAL PROTEIN: 6.8 G/DL (ref 6.1–8)
TRIGL SERPL-MCNC: 71 MG/DL (ref 0–199)
TSH SERPL DL<=0.05 MIU/L-ACNC: 4.85 UIU/ML (ref 0.4–4.2)
VITAMIN B-12: 618 PG/ML (ref 211–911)
WBC # BLD: 6 THOU/MM3 (ref 4.8–10.8)
WBC # BLD: 6.3 THOU/MM3 (ref 4.8–10.8)
WBC # BLD: 6.7 THOU/MM3 (ref 4.8–10.8)

## 2019-01-01 PROCEDURE — G8427 DOCREV CUR MEDS BY ELIG CLIN: HCPCS | Performed by: FAMILY MEDICINE

## 2019-01-01 PROCEDURE — 99305 1ST NF CARE MODERATE MDM 35: CPT | Performed by: PHYSICAL MEDICINE & REHABILITATION

## 2019-01-01 PROCEDURE — 97110 THERAPEUTIC EXERCISES: CPT

## 2019-01-01 PROCEDURE — 6370000000 HC RX 637 (ALT 250 FOR IP): Performed by: INTERNAL MEDICINE

## 2019-01-01 PROCEDURE — 80048 BASIC METABOLIC PNL TOTAL CA: CPT

## 2019-01-01 PROCEDURE — G8598 ASA/ANTIPLAT THER USED: HCPCS | Performed by: FAMILY MEDICINE

## 2019-01-01 PROCEDURE — 97530 THERAPEUTIC ACTIVITIES: CPT

## 2019-01-01 PROCEDURE — 99233 SBSQ HOSP IP/OBS HIGH 50: CPT | Performed by: INTERNAL MEDICINE

## 2019-01-01 PROCEDURE — 99222 1ST HOSP IP/OBS MODERATE 55: CPT | Performed by: INTERNAL MEDICINE

## 2019-01-01 PROCEDURE — 84300 ASSAY OF URINE SODIUM: CPT

## 2019-01-01 PROCEDURE — 2709999900 HC NON-CHARGEABLE SUPPLY

## 2019-01-01 PROCEDURE — 93005 ELECTROCARDIOGRAM TRACING: CPT | Performed by: EMERGENCY MEDICINE

## 2019-01-01 PROCEDURE — 85025 COMPLETE CBC W/AUTO DIFF WBC: CPT

## 2019-01-01 PROCEDURE — 99238 HOSP IP/OBS DSCHRG MGMT 30/<: CPT | Performed by: INTERNAL MEDICINE

## 2019-01-01 PROCEDURE — G8417 CALC BMI ABV UP PARAM F/U: HCPCS | Performed by: FAMILY MEDICINE

## 2019-01-01 PROCEDURE — 4040F PNEUMOC VAC/ADMIN/RCVD: CPT | Performed by: FAMILY MEDICINE

## 2019-01-01 PROCEDURE — 6360000002 HC RX W HCPCS: Performed by: INTERNAL MEDICINE

## 2019-01-01 PROCEDURE — 6370000000 HC RX 637 (ALT 250 FOR IP): Performed by: EMERGENCY MEDICINE

## 2019-01-01 PROCEDURE — G8926 SPIRO NO PERF OR DOC: HCPCS | Performed by: FAMILY MEDICINE

## 2019-01-01 PROCEDURE — 36415 COLL VENOUS BLD VENIPUNCTURE: CPT

## 2019-01-01 PROCEDURE — 1200000000 HC SEMI PRIVATE

## 2019-01-01 PROCEDURE — 1036F TOBACCO NON-USER: CPT | Performed by: FAMILY MEDICINE

## 2019-01-01 PROCEDURE — 1100F PTFALLS ASSESS-DOCD GE2>/YR: CPT | Performed by: FAMILY MEDICINE

## 2019-01-01 PROCEDURE — 99232 SBSQ HOSP IP/OBS MODERATE 35: CPT | Performed by: INTERNAL MEDICINE

## 2019-01-01 PROCEDURE — 2500000003 HC RX 250 WO HCPCS: Performed by: NURSE PRACTITIONER

## 2019-01-01 PROCEDURE — 3288F FALL RISK ASSESSMENT DOCD: CPT | Performed by: FAMILY MEDICINE

## 2019-01-01 PROCEDURE — 6370000000 HC RX 637 (ALT 250 FOR IP): Performed by: PSYCHIATRY & NEUROLOGY

## 2019-01-01 PROCEDURE — 99214 OFFICE O/P EST MOD 30 MIN: CPT | Performed by: FAMILY MEDICINE

## 2019-01-01 PROCEDURE — 97166 OT EVAL MOD COMPLEX 45 MIN: CPT

## 2019-01-01 PROCEDURE — 85610 PROTHROMBIN TIME: CPT

## 2019-01-01 PROCEDURE — 92610 EVALUATE SWALLOWING FUNCTION: CPT

## 2019-01-01 PROCEDURE — 82948 REAGENT STRIP/BLOOD GLUCOSE: CPT

## 2019-01-01 PROCEDURE — 99285 EMERGENCY DEPT VISIT HI MDM: CPT

## 2019-01-01 PROCEDURE — 99213 OFFICE O/P EST LOW 20 MIN: CPT | Performed by: FAMILY MEDICINE

## 2019-01-01 PROCEDURE — 3023F SPIROM DOC REV: CPT | Performed by: FAMILY MEDICINE

## 2019-01-01 PROCEDURE — 85730 THROMBOPLASTIN TIME PARTIAL: CPT

## 2019-01-01 PROCEDURE — G8482 FLU IMMUNIZE ORDER/ADMIN: HCPCS | Performed by: FAMILY MEDICINE

## 2019-01-01 PROCEDURE — G0426 INPT/ED TELECONSULT50: HCPCS | Performed by: PSYCHIATRY & NEUROLOGY

## 2019-01-01 PROCEDURE — 84443 ASSAY THYROID STIM HORMONE: CPT

## 2019-01-01 PROCEDURE — 83735 ASSAY OF MAGNESIUM: CPT

## 2019-01-01 PROCEDURE — 80053 COMPREHEN METABOLIC PANEL: CPT

## 2019-01-01 PROCEDURE — 93010 ELECTROCARDIOGRAM REPORT: CPT | Performed by: INTERNAL MEDICINE

## 2019-01-01 PROCEDURE — 6370000000 HC RX 637 (ALT 250 FOR IP): Performed by: FAMILY MEDICINE

## 2019-01-01 PROCEDURE — 2580000003 HC RX 258: Performed by: INTERNAL MEDICINE

## 2019-01-01 PROCEDURE — 99238 HOSP IP/OBS DSCHRG MGMT 30/<: CPT | Performed by: FAMILY MEDICINE

## 2019-01-01 PROCEDURE — 2500000003 HC RX 250 WO HCPCS: Performed by: FAMILY MEDICINE

## 2019-01-01 PROCEDURE — 0518F FALL PLAN OF CARE DOCD: CPT | Performed by: FAMILY MEDICINE

## 2019-01-01 PROCEDURE — 6360000002 HC RX W HCPCS: Performed by: NURSE PRACTITIONER

## 2019-01-01 PROCEDURE — 82607 VITAMIN B-12: CPT

## 2019-01-01 PROCEDURE — 97161 PT EVAL LOW COMPLEX 20 MIN: CPT

## 2019-01-01 PROCEDURE — 2580000003 HC RX 258: Performed by: PSYCHIATRY & NEUROLOGY

## 2019-01-01 PROCEDURE — 99223 1ST HOSP IP/OBS HIGH 75: CPT | Performed by: PSYCHIATRY & NEUROLOGY

## 2019-01-01 PROCEDURE — 1123F ACP DISCUSS/DSCN MKR DOCD: CPT | Performed by: FAMILY MEDICINE

## 2019-01-01 PROCEDURE — 80061 LIPID PANEL: CPT

## 2019-01-01 PROCEDURE — 92526 ORAL FUNCTION THERAPY: CPT

## 2019-01-01 PROCEDURE — 70450 CT HEAD/BRAIN W/O DYE: CPT

## 2019-01-01 PROCEDURE — 2700000000 HC OXYGEN THERAPY PER DAY

## 2019-01-01 PROCEDURE — 99232 SBSQ HOSP IP/OBS MODERATE 35: CPT | Performed by: FAMILY MEDICINE

## 2019-01-01 PROCEDURE — 99222 1ST HOSP IP/OBS MODERATE 55: CPT | Performed by: NURSE PRACTITIONER

## 2019-01-01 PROCEDURE — 84439 ASSAY OF FREE THYROXINE: CPT

## 2019-01-01 RX ORDER — MIRTAZAPINE 7.5 MG/1
7.5 TABLET, FILM COATED ORAL NIGHTLY
Qty: 30 TABLET | Refills: 0 | Status: SHIPPED | OUTPATIENT
Start: 2019-01-01 | End: 2019-01-01

## 2019-01-01 RX ORDER — GLYCOPYRROLATE 0.2 MG/ML
0.3 INJECTION INTRAMUSCULAR; INTRAVENOUS EVERY 4 HOURS
Status: DISCONTINUED | OUTPATIENT
Start: 2019-01-01 | End: 2019-01-01 | Stop reason: HOSPADM

## 2019-01-01 RX ORDER — BISACODYL 10 MG
10 SUPPOSITORY, RECTAL RECTAL DAILY PRN
Status: DISCONTINUED | OUTPATIENT
Start: 2019-01-01 | End: 2019-01-01 | Stop reason: HOSPADM

## 2019-01-01 RX ORDER — MORPHINE SULFATE 4 MG/ML
4 INJECTION, SOLUTION INTRAMUSCULAR; INTRAVENOUS
Status: DISCONTINUED | OUTPATIENT
Start: 2019-01-01 | End: 2019-01-01 | Stop reason: HOSPADM

## 2019-01-01 RX ORDER — ACETAMINOPHEN 325 MG/1
650 TABLET ORAL EVERY 4 HOURS PRN
Status: DISCONTINUED | OUTPATIENT
Start: 2019-01-01 | End: 2019-01-01 | Stop reason: HOSPADM

## 2019-01-01 RX ORDER — CLOPIDOGREL BISULFATE 75 MG/1
75 TABLET ORAL DAILY
Status: DISCONTINUED | OUTPATIENT
Start: 2019-01-01 | End: 2019-01-01 | Stop reason: HOSPADM

## 2019-01-01 RX ORDER — ONDANSETRON 2 MG/ML
4 INJECTION INTRAMUSCULAR; INTRAVENOUS EVERY 6 HOURS PRN
Status: DISCONTINUED | OUTPATIENT
Start: 2019-01-01 | End: 2019-01-01 | Stop reason: HOSPADM

## 2019-01-01 RX ORDER — DONEPEZIL HYDROCHLORIDE 5 MG/1
5 TABLET, FILM COATED ORAL NIGHTLY
Qty: 30 TABLET | Refills: 3
Start: 2019-01-01

## 2019-01-01 RX ORDER — LORAZEPAM 2 MG/ML
1 INJECTION INTRAMUSCULAR
Status: DISCONTINUED | OUTPATIENT
Start: 2019-01-01 | End: 2019-01-01 | Stop reason: HOSPADM

## 2019-01-01 RX ORDER — GLYCOPYRROLATE 0.2 MG/ML
0.2 INJECTION INTRAMUSCULAR; INTRAVENOUS EVERY 4 HOURS PRN
Status: DISCONTINUED | OUTPATIENT
Start: 2019-01-01 | End: 2019-01-01

## 2019-01-01 RX ORDER — SODIUM CHLORIDE 9 MG/ML
INJECTION, SOLUTION INTRAVENOUS CONTINUOUS
Status: DISCONTINUED | OUTPATIENT
Start: 2019-01-01 | End: 2019-01-01

## 2019-01-01 RX ORDER — DOCUSATE SODIUM 50 MG/5ML
250 LIQUID ORAL DAILY
Status: DISCONTINUED | OUTPATIENT
Start: 2019-01-01 | End: 2019-01-01 | Stop reason: HOSPADM

## 2019-01-01 RX ORDER — FAMOTIDINE 20 MG/1
20 TABLET, FILM COATED ORAL DAILY
Status: DISCONTINUED | OUTPATIENT
Start: 2019-01-01 | End: 2019-01-01 | Stop reason: HOSPADM

## 2019-01-01 RX ORDER — ASPIRIN 81 MG/1
81 TABLET ORAL DAILY
Status: DISCONTINUED | OUTPATIENT
Start: 2019-01-01 | End: 2019-01-01 | Stop reason: HOSPADM

## 2019-01-01 RX ORDER — ASPIRIN 81 MG/1
324 TABLET, CHEWABLE ORAL ONCE
Status: COMPLETED | OUTPATIENT
Start: 2019-01-01 | End: 2019-01-01

## 2019-01-01 RX ORDER — RISPERIDONE 0.25 MG/1
0.25 TABLET, FILM COATED ORAL 2 TIMES DAILY
Qty: 60 TABLET | Refills: 0 | Status: SHIPPED | OUTPATIENT
Start: 2019-01-01 | End: 2019-01-01

## 2019-01-01 RX ORDER — SODIUM CHLORIDE 0.9 % (FLUSH) 0.9 %
10 SYRINGE (ML) INJECTION PRN
Status: DISCONTINUED | OUTPATIENT
Start: 2019-01-01 | End: 2019-01-01 | Stop reason: HOSPADM

## 2019-01-01 RX ORDER — PRAVASTATIN SODIUM 40 MG
40 TABLET ORAL EVERY EVENING
Qty: 90 TABLET | Refills: 1 | Status: CANCELLED | OUTPATIENT
Start: 2019-01-01

## 2019-01-01 RX ORDER — FUROSEMIDE 40 MG/1
40 TABLET ORAL DAILY
Status: DISCONTINUED | OUTPATIENT
Start: 2019-01-01 | End: 2019-01-01

## 2019-01-01 RX ORDER — GABAPENTIN 100 MG/1
100 CAPSULE ORAL NIGHTLY
Status: DISCONTINUED | OUTPATIENT
Start: 2019-01-01 | End: 2019-01-01 | Stop reason: HOSPADM

## 2019-01-01 RX ORDER — SODIUM CHLORIDE 0.9 % (FLUSH) 0.9 %
10 SYRINGE (ML) INJECTION EVERY 12 HOURS SCHEDULED
Status: DISCONTINUED | OUTPATIENT
Start: 2019-01-01 | End: 2019-01-01 | Stop reason: HOSPADM

## 2019-01-01 RX ORDER — CLOPIDOGREL BISULFATE 75 MG/1
75 TABLET ORAL DAILY
Qty: 21 TABLET | Refills: 0
Start: 2019-01-01 | End: 2019-01-01

## 2019-01-01 RX ORDER — MORPHINE SULFATE 2 MG/ML
2 INJECTION, SOLUTION INTRAMUSCULAR; INTRAVENOUS
Status: DISCONTINUED | OUTPATIENT
Start: 2019-01-01 | End: 2019-01-01 | Stop reason: HOSPADM

## 2019-01-01 RX ORDER — SCOLOPAMINE TRANSDERMAL SYSTEM 1 MG/1
1 PATCH, EXTENDED RELEASE TRANSDERMAL
Status: DISCONTINUED | OUTPATIENT
Start: 2019-01-01 | End: 2019-01-01 | Stop reason: HOSPADM

## 2019-01-01 RX ORDER — SODIUM CHLORIDE 9 MG/ML
INJECTION, SOLUTION INTRAVENOUS CONTINUOUS
Status: ACTIVE | OUTPATIENT
Start: 2019-01-01 | End: 2019-01-01

## 2019-01-01 RX ORDER — PRAVASTATIN SODIUM 40 MG
40 TABLET ORAL NIGHTLY
Status: DISCONTINUED | OUTPATIENT
Start: 2019-01-01 | End: 2019-01-01 | Stop reason: HOSPADM

## 2019-01-01 RX ORDER — GABAPENTIN 100 MG/1
100 CAPSULE ORAL NIGHTLY
Qty: 90 CAPSULE | Refills: 3
Start: 2019-01-01 | End: 2019-08-18

## 2019-01-01 RX ORDER — CLOPIDOGREL BISULFATE 75 MG/1
225 TABLET ORAL ONCE
Status: COMPLETED | OUTPATIENT
Start: 2019-01-01 | End: 2019-01-01

## 2019-01-01 RX ORDER — DONEPEZIL HYDROCHLORIDE 5 MG/1
5 TABLET, FILM COATED ORAL NIGHTLY
Status: DISCONTINUED | OUTPATIENT
Start: 2019-01-01 | End: 2019-01-01 | Stop reason: HOSPADM

## 2019-01-01 RX ORDER — LIDOCAINE 50 MG/G
1 PATCH TOPICAL DAILY
Qty: 30 PATCH | Refills: 1 | Status: SHIPPED | OUTPATIENT
Start: 2019-01-01 | End: 2019-01-01

## 2019-01-01 RX ORDER — ISOSORBIDE DINITRATE 10 MG/1
10 TABLET ORAL 3 TIMES DAILY
Status: DISCONTINUED | OUTPATIENT
Start: 2019-01-01 | End: 2019-01-01 | Stop reason: HOSPADM

## 2019-01-01 RX ADMIN — ISOSORBIDE DINITRATE 10 MG: 10 TABLET ORAL at 15:40

## 2019-01-01 RX ADMIN — MORPHINE SULFATE 4 MG: 4 INJECTION INTRAVENOUS at 14:46

## 2019-01-01 RX ADMIN — DONEPEZIL HYDROCHLORIDE 5 MG: 5 TABLET, FILM COATED ORAL at 20:27

## 2019-01-01 RX ADMIN — METOPROLOL TARTRATE 12.5 MG: 25 TABLET ORAL at 08:41

## 2019-01-01 RX ADMIN — PRAVASTATIN SODIUM 40 MG: 40 TABLET ORAL at 22:04

## 2019-01-01 RX ADMIN — FUROSEMIDE 40 MG: 40 TABLET ORAL at 08:30

## 2019-01-01 RX ADMIN — GLYCOPYRROLATE 0.2 MG: 0.2 INJECTION, SOLUTION INTRAMUSCULAR; INTRAVENOUS at 10:16

## 2019-01-01 RX ADMIN — FAMOTIDINE 20 MG: 20 TABLET ORAL at 10:12

## 2019-01-01 RX ADMIN — DONEPEZIL HYDROCHLORIDE 5 MG: 5 TABLET, FILM COATED ORAL at 22:04

## 2019-01-01 RX ADMIN — ISOSORBIDE DINITRATE 10 MG: 10 TABLET ORAL at 21:39

## 2019-01-01 RX ADMIN — METOPROLOL TARTRATE 12.5 MG: 25 TABLET ORAL at 22:04

## 2019-01-01 RX ADMIN — METOPROLOL TARTRATE 12.5 MG: 25 TABLET ORAL at 08:57

## 2019-01-01 RX ADMIN — GABAPENTIN 100 MG: 100 CAPSULE ORAL at 22:04

## 2019-01-01 RX ADMIN — ISOSORBIDE DINITRATE 10 MG: 10 TABLET ORAL at 14:41

## 2019-01-01 RX ADMIN — ASPIRIN 81 MG: 81 TABLET, COATED ORAL at 10:12

## 2019-01-01 RX ADMIN — METOPROLOL TARTRATE 12.5 MG: 25 TABLET ORAL at 20:27

## 2019-01-01 RX ADMIN — ISOSORBIDE DINITRATE 10 MG: 10 TABLET ORAL at 10:11

## 2019-01-01 RX ADMIN — GLYCOPYRROLATE 0.3 MG: 0.2 INJECTION, SOLUTION INTRAMUSCULAR; INTRAVENOUS at 23:08

## 2019-01-01 RX ADMIN — CLOPIDOGREL BISULFATE 225 MG: 75 TABLET ORAL at 10:21

## 2019-01-01 RX ADMIN — ACETAMINOPHEN 650 MG: 325 TABLET ORAL at 05:00

## 2019-01-01 RX ADMIN — ENOXAPARIN SODIUM 30 MG: 30 INJECTION SUBCUTANEOUS at 21:39

## 2019-01-01 RX ADMIN — ASPIRIN 81 MG: 81 TABLET, COATED ORAL at 08:30

## 2019-01-01 RX ADMIN — ASPIRIN 81 MG: 81 TABLET, COATED ORAL at 08:57

## 2019-01-01 RX ADMIN — MORPHINE SULFATE 2 MG: 2 INJECTION, SOLUTION INTRAMUSCULAR; INTRAVENOUS at 23:08

## 2019-01-01 RX ADMIN — MORPHINE SULFATE 2 MG: 2 INJECTION, SOLUTION INTRAMUSCULAR; INTRAVENOUS at 10:16

## 2019-01-01 RX ADMIN — PRAVASTATIN SODIUM 40 MG: 40 TABLET ORAL at 21:39

## 2019-01-01 RX ADMIN — ASPIRIN 81 MG: 81 TABLET, COATED ORAL at 08:39

## 2019-01-01 RX ADMIN — Medication 10 ML: at 22:04

## 2019-01-01 RX ADMIN — DOCUSATE SODIUM 250 MG: 50 LIQUID ORAL at 14:41

## 2019-01-01 RX ADMIN — MORPHINE SULFATE 4 MG: 4 INJECTION INTRAVENOUS at 12:30

## 2019-01-01 RX ADMIN — FAMOTIDINE 20 MG: 20 TABLET ORAL at 08:57

## 2019-01-01 RX ADMIN — ACETAMINOPHEN 650 MG: 325 TABLET ORAL at 08:57

## 2019-01-01 RX ADMIN — ISOSORBIDE DINITRATE 10 MG: 10 TABLET ORAL at 08:30

## 2019-01-01 RX ADMIN — ISOSORBIDE DINITRATE 10 MG: 10 TABLET ORAL at 08:39

## 2019-01-01 RX ADMIN — MORPHINE SULFATE 2 MG: 2 INJECTION, SOLUTION INTRAMUSCULAR; INTRAVENOUS at 16:13

## 2019-01-01 RX ADMIN — ISOSORBIDE DINITRATE 10 MG: 10 TABLET ORAL at 20:27

## 2019-01-01 RX ADMIN — ENOXAPARIN SODIUM 30 MG: 30 INJECTION SUBCUTANEOUS at 20:27

## 2019-01-01 RX ADMIN — ENOXAPARIN SODIUM 30 MG: 30 INJECTION SUBCUTANEOUS at 22:04

## 2019-01-01 RX ADMIN — GLYCOPYRROLATE 0.2 MG: 0.2 INJECTION, SOLUTION INTRAMUSCULAR; INTRAVENOUS at 16:13

## 2019-01-01 RX ADMIN — DOCUSATE SODIUM 250 MG: 50 LIQUID ORAL at 10:11

## 2019-01-01 RX ADMIN — ISOSORBIDE DINITRATE 10 MG: 10 TABLET ORAL at 15:37

## 2019-01-01 RX ADMIN — SODIUM CHLORIDE: 9 INJECTION, SOLUTION INTRAVENOUS at 20:16

## 2019-01-01 RX ADMIN — SODIUM CHLORIDE: 9 INJECTION, SOLUTION INTRAVENOUS at 10:14

## 2019-01-01 RX ADMIN — LORAZEPAM 1 MG: 2 INJECTION INTRAMUSCULAR; INTRAVENOUS at 08:56

## 2019-01-01 RX ADMIN — FAMOTIDINE 20 MG: 20 TABLET ORAL at 08:39

## 2019-01-01 RX ADMIN — PRAVASTATIN SODIUM 40 MG: 40 TABLET ORAL at 20:27

## 2019-01-01 RX ADMIN — LORAZEPAM 1 MG: 2 INJECTION INTRAMUSCULAR; INTRAVENOUS at 16:13

## 2019-01-01 RX ADMIN — ISOSORBIDE DINITRATE 10 MG: 10 TABLET ORAL at 08:57

## 2019-01-01 RX ADMIN — CLOPIDOGREL BISULFATE 75 MG: 75 TABLET ORAL at 08:57

## 2019-01-01 RX ADMIN — GLYCOPYRROLATE 0.3 MG: 0.2 INJECTION, SOLUTION INTRAMUSCULAR; INTRAVENOUS at 18:11

## 2019-01-01 RX ADMIN — Medication 10 ML: at 10:15

## 2019-01-01 RX ADMIN — ISOSORBIDE DINITRATE 10 MG: 10 TABLET ORAL at 22:04

## 2019-01-01 RX ADMIN — GLYCOPYRROLATE 0.3 MG: 0.2 INJECTION, SOLUTION INTRAMUSCULAR; INTRAVENOUS at 14:32

## 2019-01-01 RX ADMIN — Medication 10 ML: at 21:43

## 2019-01-01 RX ADMIN — CLOPIDOGREL BISULFATE 75 MG: 75 TABLET ORAL at 08:39

## 2019-01-01 RX ADMIN — METOPROLOL TARTRATE 12.5 MG: 25 TABLET ORAL at 10:12

## 2019-01-01 RX ADMIN — ASPIRIN 81 MG 324 MG: 81 TABLET ORAL at 16:25

## 2019-01-01 RX ADMIN — FAMOTIDINE 20 MG: 20 TABLET ORAL at 08:30

## 2019-01-01 RX ADMIN — MORPHINE SULFATE 4 MG: 4 INJECTION INTRAVENOUS at 18:16

## 2019-01-01 RX ADMIN — Medication 10 ML: at 20:32

## 2019-01-01 ASSESSMENT — PATIENT HEALTH QUESTIONNAIRE - PHQ9
SUM OF ALL RESPONSES TO PHQ QUESTIONS 1-9: 2
2. FEELING DOWN, DEPRESSED OR HOPELESS: 1
1. LITTLE INTEREST OR PLEASURE IN DOING THINGS: 1
SUM OF ALL RESPONSES TO PHQ9 QUESTIONS 1 & 2: 2
SUM OF ALL RESPONSES TO PHQ QUESTIONS 1-9: 2

## 2019-01-01 ASSESSMENT — ENCOUNTER SYMPTOMS
SORE THROAT: 0
RHINORRHEA: 0
SHORTNESS OF BREATH: 0
EYE REDNESS: 0
VOMITING: 0
SHORTNESS OF BREATH: 0
FACIAL SWELLING: 0
WHEEZING: 0
WHEEZING: 0
SHORTNESS OF BREATH: 0
SORE THROAT: 0
EYE REDNESS: 0
BACK PAIN: 0
RHINORRHEA: 0
COUGH: 0
CONSTIPATION: 0
WHEEZING: 0
WHEEZING: 0
VOMITING: 0
NAUSEA: 0
DIARRHEA: 0
COUGH: 1
SHORTNESS OF BREATH: 0
NAUSEA: 0
ABDOMINAL PAIN: 0
DIARRHEA: 0
COUGH: 0
EYE DISCHARGE: 0
COUGH: 1

## 2019-01-01 ASSESSMENT — PAIN DESCRIPTION - FREQUENCY
FREQUENCY: CONTINUOUS
FREQUENCY: CONTINUOUS

## 2019-01-01 ASSESSMENT — PAIN SCALES - GENERAL
PAINLEVEL_OUTOF10: 0
PAINLEVEL_OUTOF10: 3
PAINLEVEL_OUTOF10: 7
PAINLEVEL_OUTOF10: 0
PAINLEVEL_OUTOF10: 0
PAINLEVEL_OUTOF10: 4
PAINLEVEL_OUTOF10: 0

## 2019-01-01 ASSESSMENT — PAIN - FUNCTIONAL ASSESSMENT: PAIN_FUNCTIONAL_ASSESSMENT: ACTIVITIES ARE NOT PREVENTED

## 2019-01-01 ASSESSMENT — PAIN DESCRIPTION - LOCATION
LOCATION: FINGER (COMMENT WHICH ONE)
LOCATION: HAND

## 2019-01-01 ASSESSMENT — PAIN DESCRIPTION - ONSET
ONSET: ON-GOING
ONSET: ON-GOING

## 2019-01-01 ASSESSMENT — PAIN DESCRIPTION - DIRECTION: RADIATING_TOWARDS: NUMB

## 2019-01-01 ASSESSMENT — PAIN DESCRIPTION - DESCRIPTORS
DESCRIPTORS: NUMBNESS;TINGLING
DESCRIPTORS: NUMBNESS

## 2019-01-01 ASSESSMENT — PAIN DESCRIPTION - PAIN TYPE
TYPE: ACUTE PAIN;CHRONIC PAIN
TYPE: ACUTE PAIN

## 2019-01-01 ASSESSMENT — PAIN DESCRIPTION - ORIENTATION: ORIENTATION: LEFT

## 2019-01-01 ASSESSMENT — PAIN DESCRIPTION - PROGRESSION: CLINICAL_PROGRESSION: NOT CHANGED

## 2019-02-04 PROBLEM — F02.80 LATE ONSET ALZHEIMER'S DISEASE WITHOUT BEHAVIORAL DISTURBANCE (HCC): Status: ACTIVE | Noted: 2019-01-01

## 2019-02-04 PROBLEM — G30.1 LATE ONSET ALZHEIMER'S DISEASE WITHOUT BEHAVIORAL DISTURBANCE (HCC): Status: ACTIVE | Noted: 2019-01-01

## 2019-04-01 NOTE — TELEPHONE ENCOUNTER
PATIENT DAUGHTER LARRY CALLED STATING PATIENT HAS DEMENTIA AND DR MAHAJAN PUT HIM ON RISPERDAL 0.25 MG BID. PATIENT'S WIFE WONT GIVE HIM THE MEDICINE UNTIL SHE KNOWS IT IS OKAY WITH DR Vesna Hood. DR Vesna Hood IS IT SAFE FOR THIS PATIENT TO TAKE RISPERDAL WITH HIS HEART MEDICATIONS? PLEASE ADVISE.

## 2019-04-04 NOTE — TELEPHONE ENCOUNTER
Gerard Mccracken from [address] phoned- states that Alta Syvlester is still refusing to give Kiana Butler despite numerous attempts to educate Alta Sylvester on this. Daughter is wondering if there is a different medication (if possible) that Dr. Galo Chapin would be willing to use to help with Neymar's behavioral issues.  Please advise    Sandeep Lines: [phone]

## 2019-05-16 PROBLEM — I63.9 STROKE, ACUTE, EMBOLIC (HCC): Status: ACTIVE | Noted: 2019-01-01

## 2019-05-16 PROBLEM — I63.9 STROKE (HCC): Status: ACTIVE | Noted: 2019-01-01

## 2019-05-16 NOTE — ED NOTES
Pt family would like to have  or hospitalist to work on nursing home placement for pt. Pt family prefers [facility] [place].       Shahbaz Bruno RN  05/16/19 7917

## 2019-05-16 NOTE — ED NOTES
Cancel CT per Dr. Jennifer Espinal. Spoke with John in CT and informed of cancellation.       Lizzie Bravo RN  05/16/19 1019

## 2019-05-16 NOTE — ED NOTES
Pt able to squeeze my hand with weak strength with his left hand     Otilia Cool, MAYNOR  05/16/19 7922

## 2019-05-16 NOTE — H&P
History & Physical        Patient:  Stephanie Oneil  YOB: 1923    MRN: [id number]     Acct: [de-identified]    PCP: Christopher Guerrero MD    Date of Admission: 5/16/2019    Date of Service: Pt seen/examined on 5/16/2019 and Admitted to Inpatient with expected LOS greater than two midnights due to medical therapy. Chief Complaint:    Chief Complaint   Patient presents with    Extremity Weakness     left    Facial Droop         History Of Present Illness:      80 y.o. male who presented to [address] [place] [address] with \"evaluation of left upper extremity weakness. The patient's family states that the patient developed sudden onset of posterior neck pain around 1200 this afternoon which was followed by onset of drooling and dysarthria. Patient's family revoked his hospice and the patient was brought to the ED for evaluation. He denies any chest pain, abdominal pain, back pain, or lower extremity pain. Patient is a DNR-CC. \"-per er note and confirmed by myself    Addendum: DNR -CC confirmed by er physician, patient was in home hospice but wife can no longer take care of him. Need ecf placement, after stroke sequela resolves.  Discussed with family present        Past Medical History:          Diagnosis Date    Arthritis     Atrial flutter, paroxysmal [facility])     [email or web address] dual pacer programmed VVIR chronic at fib  5/29/2012    BPH (benign prostatic hyperplasia)     CAD (coronary artery disease)     Status post bypass    Cancer ([address]     CHF (congestive heart failure) (HCC)     Chronic kidney disease     Dyslipidemia     Hyperlipidemia     Movement disorder     Sick sinus syndrome ([address]     pace maker    Small bowel obstruction ([address]        Past Surgical History:          Procedure Laterality Date    CHOLECYSTECTOMY  11/15/2004    Dr Stephani Moore    COLONOSCOPY  9/04/2009    Dr Rohan Morales, COLON, DIAGNOSTIC      EYE SURGERY      JOINT REPLACEMENT      both hips    PACEMAKER INSERTION  2012    SKIN CANCER EXCISION      UPPER GASTROINTESTINAL ENDOSCOPY  10/05/2007    Dr Sheri Cockayne       Medications Prior to Admission:      Prior to Admission medications    Medication Sig Start Date End Date Taking? Authorizing Provider   pravastatin (PRAVACHOL) 40 MG tablet TAKE 1 TABLET BY MOUTH ONE TIME A DAY in the evening 3/27/19  Yes Irina Poole MD   metoprolol tartrate (LOPRESSOR) 25 MG tablet Take 0.5 tablets by mouth 2 times daily 10/29/18  Yes Irina Poole MD   isosorbide dinitrate (ISORDIL) 10 MG tablet Take 1 tablet by mouth 3 times daily 10/29/18  Yes Irina Poole MD   furosemide (LASIX) 40 MG tablet Take 1 tablet by mouth daily One po q day prn leg edema  Patient taking differently: Take 40 mg by mouth daily  10/29/18  Yes Irina Poole MD   potassium chloride (KLOR-CON) 8 MEQ extended release tablet Take 1 tablet by mouth 2 times daily 10/29/18  Yes Irina Poole MD   aspirin EC 81 MG EC tablet Take 1 tablet by mouth daily 10/29/18  Yes Irina Poole MD   senna (SENOKOT) 8.6 MG tablet Take 1 tablet by mouth nightly   Yes Historical Provider, MD   docusate sodium (COLACE) 250 MG capsule Take 250 mg by mouth daily. Yes Historical Provider, MD   Handicap Placard MISC by Does not apply route Duration 5 years  Exp:  12/18/2023  Dx:  COPD and CHF 12/18/18   MD Nicholas Connolly MISC by Does not apply route Duration 5 years  Exp:  12/18/2023  Dx:  COPD and CHF 12/18/18   Irina Poole MD   Acetaminophen (TYLENOL ARTHRITIS PAIN PO) Take by mouth    Historical Provider, MD       Allergies:  Potassium chloride er    Social History:      The patient currently lives home    TOBACCO:   reports that he quit smoking about 26 years ago. His smoking use included cigarettes. He has quit using smokeless tobacco.  ETOH:   reports that he does not drink alcohol.       Family History:      Reviewed in detail and negative for DM, CAD, Cancer, CVA. Positive as follows:        Problem Relation Age of Onset    Early Death Mother     Heart Disease Brother     Cancer Brother     Heart Disease Father     Cancer Sister     Arthritis Paternal Aunt     Other Paternal Aunt        Diet:  No diet orders on file    REVIEW OF SYSTEMS:   Pertinent positives as noted in the HPI. All other systems reviewed and negative. PHYSICAL EXAM:    /76   Pulse 82   Temp 98 °F (36.7 °C) (Oral)   Resp 26   Ht 5' 6\" (1.676 m)   Wt 143 lb (64.9 kg)   SpO2 93%   BMI 23.08 kg/m²     General appearance:  No apparent distress, appears stated age and cooperative. HEENT:  Normal cephalic, atraumatic without obvious deformity. Pupils equal, round, and reactive to light. Extra ocular muscles intact. Conjunctivae/corneas clear. Facial droop L side, speech difficulty  Neck: Supple, with full range of motion. no jugular venous distention. Trachea midline. no carotid bruits  Respiratory:  Normal respiratory effort. Clear to auscultation, bilaterally without Rales/Wheezes/Rhonchi. Breath sounds equal bilaterally  Cardiovascular:  irRegular rate and rhythm with normal S1/S2 with 3-4/6 murmur. -ve rubs or gallops. PMI non displaced  Abdomen: Soft, non-tender, non-distended with normal bowel sounds. No guarding, rebound. Musculoskeletal:  No clubbing, cyanosis or edema bilaterally. Full range of motion without deformity. Skin: Skin color, texture, turgor normal.  No rashes or lesions, or suspicious lesions. Neurologic:  Neurovascularly intact without any focal sensory/motor deficits.  Cranial nerves: II-XII intact, grossly non-focal. minimal motor in L hand  Psychiatric:  Alert and oriented, thought content appropriate, normal insight  Capillary Refill: Brisk,< 2 seconds   Peripheral Pulses: +2 palpable, equal bilaterally upper and lower extremities  Lymphatics: no lymphadenopathy      Labs:     Recent Labs     05/16/19  1544   WBC 6.7   HGB 10.9*   HCT 35.2*        Recent Labs     05/16/19  1544      K 4.5      CO2 29   BUN 38*   CREATININE 1.7*   CALCIUM 9.1     Recent Labs     05/16/19  1544   AST 15   ALT <5*   BILITOT 0.4   ALKPHOS 80     Recent Labs     05/16/19  1544   INR 1.07     No results for input(s): [place] in the last 72 hours. Urinalysis:      Lab Results   Component Value Date    NITRU Negative 10/02/2013    WBCUA 0-2 03/22/2013    BACTERIA NONE 03/22/2013    RBCUA 0-2 03/22/2013    BLOODU neg 04/08/2015    BLOODU Negative 10/02/2013    SPECGRAV 1.026 03/22/2013    GLUCOSEU neg 04/08/2015    GLUCOSEU neg 10/02/2013       Radiology:     CXR: I have reviewed the CXR with the following interpretation:  EKG:  I have reviewed the EKG with the following interpretation:     Ct Head Wo Contrast (code Stroke)    Result Date: 5/16/2019  PROCEDURE: CT HEAD WO CONTRAST CLINICAL INFORMATION: stroke like symptoms. COMPARISON: No prior study. TECHNIQUE: Noncontrast 5 mm axial images were obtained through the brain. All CT scans at this facility use dose modulation, iterative reconstruction, and/or weight-based dosing when appropriate to reduce radiation dose to as low as reasonably achievable. FINDINGS: No acute intracranial findings. Generalized volume loss and small vessel ischemic changes. Prominent ventricles in keeping with central atrophy. No acute intracranial hemorrhage or midline shift. Mild paranasal sinus mucosal thickening. Mastoid air cells are patent. Skull: Unremarkable. Soft tissues: Unremarkable. No acute intracranial findings. **This report has been created using voice recognition software. It may contain minor errors which are inherent in voice recognition technology. ** Final report electronically signed by Dr. Edu Becerra on 5/16/2019 3:05 PM           DVT prophylaxis: [x] Lovenox                                 [] SCDs                                 [] SQ Heparin                                 [] Encourage ambulation           [] Already on Anticoagulation    Code Status: Prior      PT/OT Eval Status:     Disposition:    [] Home       [] TCU       [] Rehab       [] Psych       [x] SNF       [] [place]       [] Other-    ASSESSMENT:    Active Hospital Problems    Diagnosis Date Noted    Stroke [facility]) [I63.9] 05/16/2019    Stroke, acute, embolic ([facility] [V93.5] 27/49/4494       PLAN:    1. Admit  2. Need ecf placement with hospice  3. Continue home meds  4. DNR CC  5. Comfort measure        Thank you Braulio Graf MD for the opportunity to be involved in this patient's care.     Electronically signed by Nick Fine DO on 5/16/2019 at 6:29 PM

## 2019-05-16 NOTE — PROGRESS NOTES
Pt family would like  or hospitalist to work on nursing home placement. Pt family prefers [facility].

## 2019-05-16 NOTE — CODE DOCUMENTATION
Pt presents to ED for stroke left arm weakness and left facial droop. Family states symptoms started at 1200 today. Pt taken directly to CT upon arrival. Pt has hx of Alzheimer's/dementia. Pt states that he was reaching up to get a water jug when he had a sharp pain in the back of his neck and then his left arm went numb and he had difficulty moving his left arm. Pt was on hospice which family revoked in order for pt to be taken to the ED today. Pt takes 81mg ASA daily. Pt previously was on Coumadin but was taken off a few months ago. NIHSS complete, EKG complete, Accu-check complete. Pt is [facility] per paperwork provided by EMS.

## 2019-05-16 NOTE — ED PROVIDER NOTES
[facility]  eMERGENCY dEPARTMENT eNCOUnter          CHIEF COMPLAINT       Chief Complaint   Patient presents with    Extremity Weakness     left    Facial Droop       Nurses Notes reviewed and I agree except as noted in the HPI. HISTORY OF PRESENT ILLNESS    Pk Fry is a 80 y.o. male who presents to the Emergency Department with a medical history of CAD and CHF for the evaluation of left upper extremity weakness. The patient's family states that the patient developed sudden onset of posterior neck pain around 1200 this afternoon which was followed by onset of drooling and dysarthria. Patient's family revoked his hospice and the patient was brought to the ED for evaluation. He denies any chest pain, abdominal pain, back pain, or lower extremity pain. Patient is a DNR-CC. Patient's family reports no additional complaints at this time. The HPI was provided by the patient and his family. REVIEW OF SYSTEMS     Review of Systems   Constitutional: Negative for appetite change, chills, fatigue and fever. HENT: Negative for congestion, ear pain, rhinorrhea and sore throat. Eyes: Negative for discharge, redness and visual disturbance. Respiratory: Negative for cough, shortness of breath and wheezing. Cardiovascular: Negative for chest pain, palpitations and leg swelling. Gastrointestinal: Negative for abdominal pain, diarrhea, nausea and vomiting. Genitourinary: Negative for decreased urine volume, difficulty urinating, dysuria and hematuria. Musculoskeletal: Positive for neck pain. Negative for arthralgias, back pain and joint swelling. Skin: Negative for pallor and rash. Allergic/Immunologic: Negative for environmental allergies. Neurological: Positive for facial asymmetry (left sided facial droop), speech difficulty and weakness (left upper extremity). Negative for dizziness, syncope, light-headedness, numbness and headaches. Hematological: Negative for adenopathy. daily  Qty: 60 tablet, Refills: 5    Associated Diagnoses: Leg edema      aspirin EC 81 MG EC tablet Take 1 tablet by mouth daily  Qty: 30 tablet, Refills: 3    Associated Diagnoses: Coronary artery disease involving native coronary artery of native heart without angina pectoris      senna (SENOKOT) 8.6 MG tablet Take 1 tablet by mouth nightly      docusate sodium (COLACE) 250 MG capsule Take 250 mg by mouth daily. !! Handicap Placard MISC by Does not apply route Duration 5 years  Exp:  2023  Dx:  COPD and CHF  Qty: 1 each, Refills: 0    Associated Diagnoses: CHF with left ventricular diastolic dysfunction, NYHA class 2 ([address] Panlobular emphysema ([address]      ! ! Handicap Placard MISC by Does not apply route Duration 5 years  Exp:  2023  Dx:  COPD and CHF  Qty: 1 each, Refills: 0    Associated Diagnoses: CHF with left ventricular diastolic dysfunction, NYHA class 2 ([address] Panlobular emphysema (HCC)      Acetaminophen (TYLENOL ARTHRITIS PAIN PO) Take by mouth       !! - Potential duplicate medications found. Please discuss with provider. ALLERGIES     is allergic to potassium chloride er. FAMILY HISTORY      indicated that his mother is . He indicated that his father is . He indicated that the status of his sister is unknown. He indicated that his brother is . He indicated that the status of his paternal aunt is unknown.   family history includes Arthritis in his paternal aunt; Cancer in his brother and sister; Early Death in his mother; Heart Disease in his brother and father; Other in his paternal aunt. SOCIAL HISTORY      reports that he quit smoking about 26 years ago. His smoking use included cigarettes. He has quit using smokeless tobacco. He reports that he does not drink alcohol or use drugs. PHYSICAL EXAM     INITIAL VITALS:  height is 5' 6\" (1.676 m) and weight is 143 lb (64.9 kg). His oral temperature is 97.7 °F (36.5 °C).  His blood pressure is 118/67 and his pulse is 103. His respiration is 16 and oxygen saturation is 97%. Physical Exam   Constitutional: He appears well-developed and well-nourished. Non-toxic appearance. HENT:   Head: Normocephalic and atraumatic. Right Ear: Tympanic membrane and external ear normal.   Left Ear: Tympanic membrane and external ear normal.   Nose: Nose normal.   Mouth/Throat: Oropharynx is clear and moist and mucous membranes are normal. No oropharyngeal exudate, posterior oropharyngeal edema or posterior oropharyngeal erythema. Eyes: Conjunctivae and EOM are normal.   Neck: Normal range of motion. Neck supple. No JVD present. Cardiovascular: Normal rate, regular rhythm, normal heart sounds, intact distal pulses and normal pulses. Exam reveals no gallop and no friction rub. No murmur heard. Pulmonary/Chest: Effort normal and breath sounds normal. No respiratory distress. He has no decreased breath sounds. He has no wheezes. He has no rhonchi. He has no rales. Abdominal: Soft. Bowel sounds are normal. He exhibits no distension. There is no tenderness. There is no rebound, no guarding and no CVA tenderness. Musculoskeletal: Normal range of motion. He exhibits no edema. Neurological: He is alert. He exhibits normal muscle tone. Coordination normal.   Patient was aware of month but not year. He presents with Ataxia and left upper extremity drift but does not make contact with the bed. I appreciated dysarthria and a left sided facial droop. Sensation of the extremities is intact. Skin: Skin is warm and dry. No rash noted. He is not diaphoretic. Nursing note and vitals reviewed. DIFFERENTIALDIAGNOSIS:   Include and are not limited to: CVA, TIA, Stroke, arterial dissection    DIAGNOSTICRESULTS     EKG: All EKG's are interpreted by the Emergency Department Physician who either signs or Co-signs this chart in the absence of acardiologist.  EKG interpreted by Harsh Ragsdale, DO:    Vent.  Rate: 80 bpm  KY interval: * ms  QRS duration: 152 ms  QTc: 505 ms  P-R-T axes: *, -80, 43  No STEMI. EKG gives impression of afib with occasional ventricular paced complexes    Compared to old EKG on 10-John-2016    RADIOLOGY: non-plain film images(s) such as CT, Ultrasound and MRI are read by the radiologist.    [address] [place] [address] (CODE STROKE)   Final Result   No acute intracranial findings. **This report has been created using voice recognition software. It may contain minor errors which are inherent in voice recognition technology. **      Final report electronically signed by Dr. Bonnie Pratt on 5/16/2019 3:05 PM          LABS:   Labs Reviewed   CBC WITH AUTO DIFFERENTIAL - Abnormal; Notable for the following components:       Result Value    RBC 3.76 (*)     Hemoglobin 10.9 (*)     Hematocrit 35.2 (*)     MCHC 31.0 (*)     RDW-SD 47.5 (*)     All other components within normal limits   COMPREHENSIVE METABOLIC PANEL W/ REFLEX TO MG FOR LOW K - Abnormal; Notable for the following components:    CREATININE 1.7 (*)     BUN 38 (*)     Alb 3.4 (*)     ALT <5 (*)     All other components within normal limits   GLOMERULAR FILTRATION RATE, ESTIMATED - Abnormal; Notable for the following components:    Est, Glom Filt Rate 38 (*)     All other components within normal limits   BASIC METABOLIC PANEL W/ REFLEX TO MG FOR LOW K - Abnormal; Notable for the following components:    BUN 35 (*)     CREATININE 1.7 (*)     All other components within normal limits   MAGNESIUM - Abnormal; Notable for the following components:    Magnesium 2.5 (*)     All other components within normal limits   CBC WITH AUTO DIFFERENTIAL - Abnormal; Notable for the following components:    RBC 3.74 (*)     Hemoglobin 11.0 (*)     Hematocrit 35.1 (*)     MCHC 31.3 (*)     RDW-SD 47.8 (*)     All other components within normal limits   GLOMERULAR FILTRATION RATE, ESTIMATED - Abnormal; Notable for the following components:    Est, Glom Filt Rate 38 (*) Dr. Jimmie oBlivar (neurology) [facility] was consulted who recommended that CTA head and neck be completed. I spoke to the patient and his presenting family how did not want additional imaging to be completed. Labs were reassuring. EKG was normal.  The patient was treated with 324 mg ASA while in the ED. I observed the patient's condition to remain stable during the duration of their stay. Due to the patient's presenting condition and work up I recommended admission and the patient along with his family were amenable to my decision. The case was discussed with Dr. Ann-Marie Woo (internal medicine) who graciously accepts the patient for admission and further evaluation. Patient declined TPA, stating that he does not want to take any chance of possibly getting a head bleed, states that he can live with the neuro deficits he currently has. Family and room agrees with patient's physician not to receive TPA. Note, patient's symptoms gradually improved while he was in the ER. He was able to open and close his hand, which she was not unable to do previously. CRITICAL CARE:   There was a high probability of clinically significant/life threatening deterioration in this patient's condition which required my urgent intervention. Total critical care time was 30 minutes. This excludes any time for separately reportable procedures. CONSULTS:  Dr. Jimmie Bolivar (neurology) [facility]  Dr. Ann-Marie Woo (internal medicine) - graciously accepts the patient for admission and further evaluation/work-up. PROCEDURES:  None     FINAL IMPRESSION      1. Cerebrovascular accident (CVA) due to other mechanism          DISPOSITION/PLAN   Admission    PATIENT REFERRED TO:  Prema Negron MD  [address] [place] [address]  [phone]            DISCHARGE MEDICATIONS:  Current Discharge Medication List          (Please note that portions of this note were completed with a voice recognition program.  Efforts weremade to

## 2019-05-17 NOTE — CARE COORDINATION
5/17/19, 11:44 AM      Candy Hatfield       Admitted from: ER, patient presented from home with left upper extremity weakness, drooling and dysarthria. 5/16/2019/ [address] day: 1   Location: [facility] Reason for admit: Stroke, acute, embolic ([address] [Z90.9] Status: Inpt. Admit order signed?: yes  PMH:  has a past medical history of Arthritis, Atrial flutter, paroxysmal ([address] [employer] dual pacer programmed VVIR chronic at fib , BPH (benign prostatic hyperplasia), CAD (coronary artery disease), Cancer ([address] CHF (congestive heart failure) ([address] Chronic kidney disease, Dyslipidemia, Hyperlipidemia, Movement disorder, Sick sinus syndrome ([address] and Small bowel obstruction ([address] Procedure: N/A  Pertinent abnormal Imaging:CT of head-no acute findings. Medications:  Scheduled Meds:   aspirin EC  81 mg Oral Daily    docusate sodium  250 mg Oral Daily    furosemide  40 mg Oral Daily    isosorbide dinitrate  10 mg Oral TID    metoprolol tartrate  12.5 mg Oral BID    sodium chloride flush  10 mL Intravenous 2 times per day    enoxaparin  30 mg Subcutaneous Q24H    famotidine  20 mg Oral Daily     Continuous Infusions:   sodium chloride 50 mL/hr at 05/16/19 2016      Pertinent Info/Orders/Treatment Plan:  Neurology consult, PT/OT/ST, IV fluids, Aspirin, Lovenox, BMP and lipid panel, consults to Palliative Care and SS, up with assistance. Diet: DIET GENERAL;   Smoking status:  reports that he quit smoking about 26 years ago. His smoking use included cigarettes.  He has quit using smokeless tobacco.   PCP: Dahiana Weathers MD  Readmission: No  Readmission Risk Score: 16%    Discharge Planning  Current Residence:  Private Residence  Living Arrangements:  Spouse/Significant Other   Support Systems:  Spouse/Significant Other, Children, Family Members  Current Services PTA:     Potential Assistance Needed:  Transitional Care, Home Care  Potential Assistance Purchasing Medications:  No  Does patient want to participate in local refill/ meds to beds program?  No  Type of Home Care Services:  None  Patient expects to be discharged to:  home with wife  Expected Discharge date:  05/20/19  Follow Up Appointment: Best Day/ Time: Thursday PM  Discharge Plan: Maggie Hector is from home with his wife and was on hospice care. SNF placement at discharge.     Evaluation: yes

## 2019-05-17 NOTE — CARE COORDINATION
5/17/19, 9:29 AM    DISCHARGE BARRIERS    Patient notes reviewed-patient had been at home with hospice. Family revoked this in order for patient to be transported to hospital to be evaluated for possible stroke. RN Elmer Hampton has advised that family is wanting [facility] for patient and spouse (80). Call to [address] (Keisha)-they are not aware of patient but, do have long term male and female beds avaialable if needed. SW will contact her after speaking with family. Call to [address] were not providing services to patient. Received call from LAUREANO Avendano at [facility] checked with AL staff and family had inquired there about possible placement. SW checked with [facility] [place]-they were not providing services either. 12:00 PM Daughter Novak Rather in patient's room. She stated that patient was residing at home with her mother who is 8 years old. Spouse was managing household needs and providing assistance to patient with help from daughter Jessenia Padron who resides in the area. Family had recently checked into [facility] and were actually planning on taking patient and spouse to facility next week in order to see if they were candidates for AL and if they liked the facility. Patient was being followed by [facility] for hospice services. Family noticed that patient was having some weakness in his arm and having difficulty ambulating with his walker and had him brought to ER as they thought he might be having a stroke. Daughter shared that spouse can not manage patient at home any longer. They revoked hospice at that time. They would like for patient to go to skilled side of facility and possibly transition to AL at a later date. HORTENSIA explained to daughter that patient is currently not meeting inpatient status per medicare guidelines which is needed for medicare coverage in a SNF. Daughter states that there are not funds to pay privately.  She is agreeable to SW making referral to [facility] any way and will speak with

## 2019-05-17 NOTE — CONSULTS
[id number] [facility] Stroke and Interventional Neurology Consult for  Elsa Stroke Alert through [facility] @ 15:49  5/16/2019 4:57 PM  Pt Name: Ainsley Casas  MRN: [id number]  YOB: 1923  Date of evaluation: 5/16/2019  Primary Care Physician: Yolanda Martins MD    Ainsley Casas is a 80 y.o. male who presents with last well 12:00 dnr cc with alzhemier's dementia with left arm numbness and subjective weakness, afib on aspirin coumadin recently stopped. + posterior neck pain prior to symptoms. Ct cervical 9-2017 with mod to severe degenerative changes of the cervical spine    Ct head no hemorrhage    No iv tpa as patient is dnr cc      Allergies  is allergic to potassium chloride er. Medications  Prior to Admission medications    Medication Sig Start Date End Date Taking? Authorizing Provider   pravastatin (PRAVACHOL) 40 MG tablet TAKE 1 TABLET BY MOUTH ONE TIME A DAY in the evening 3/27/19  Yes Yolanda Martins MD   metoprolol tartrate (LOPRESSOR) 25 MG tablet Take 0.5 tablets by mouth 2 times daily 10/29/18  Yes Yolanda Martins MD   isosorbide dinitrate (ISORDIL) 10 MG tablet Take 1 tablet by mouth 3 times daily 10/29/18  Yes Yolanda Martins MD   furosemide (LASIX) 40 MG tablet Take 1 tablet by mouth daily One po q day prn leg edema  Patient taking differently: Take 40 mg by mouth daily  10/29/18  Yes Yolanda Martins MD   potassium chloride (KLOR-CON) 8 MEQ extended release tablet Take 1 tablet by mouth 2 times daily 10/29/18  Yes Yolanda Martins MD   aspirin EC 81 MG EC tablet Take 1 tablet by mouth daily 10/29/18  Yes Yolanda Martins MD   senna (SENOKOT) 8.6 MG tablet Take 1 tablet by mouth nightly   Yes Historical Provider, MD   docusate sodium (COLACE) 250 MG capsule Take 250 mg by mouth daily.      Yes Historical Provider, MD   Handicap Placard MISC by Does not apply route Duration 5 years  Exp:  12/18/2023  Dx:  COPD and CHF 12/18/18   MD Nicholas Richardson by Does not apply route Duration 5 years  Exp:  2023  Dx:  COPD and CHF 18   Yara Glass MD   Acetaminophen (TYLENOL ARTHRITIS PAIN PO) Take by mouth    Historical Provider, MD    Scheduled Meds:   aspirin EC  81 mg Oral Daily    docusate sodium  250 mg Oral Daily    isosorbide dinitrate  10 mg Oral TID    metoprolol tartrate  12.5 mg Oral BID    sodium chloride flush  10 mL Intravenous 2 times per day    enoxaparin  30 mg Subcutaneous Q24H    famotidine  20 mg Oral Daily     Continuous Infusions:   sodium chloride 50 mL/hr at 19     PRN Meds:.sodium chloride flush, magnesium hydroxide, ondansetron, acetaminophen  Past Medical History   has a past medical history of Arthritis, Atrial flutter, paroxysmal ([address] [employer] dual pacer programmed VVIR chronic at fib , BPH (benign prostatic hyperplasia), CAD (coronary artery disease), Cancer ([address] CHF (congestive heart failure) ([address] Chronic kidney disease, Dyslipidemia, Hyperlipidemia, Movement disorder, Sick sinus syndrome ([address] and Small bowel obstruction ([address]   Social History  Social History     Socioeconomic History    Marital status:      Spouse name: Not on file    Number of children: Not on file    Years of education: Not on file    Highest education level: Not on file   Occupational History    Not on file   Social Needs    Financial resource strain: Not on file    Food insecurity:     Worry: Not on file     Inability: Not on file    Transportation needs:     Medical: Not on file     Non-medical: Not on file   Tobacco Use    Smoking status: Former Smoker     Types: Cigarettes     Last attempt to quit: 1993     Years since quittin.1    Smokeless tobacco: Former User   Substance and Sexual Activity    Alcohol use: No    Drug use: No    Sexual activity: Not Currently     Partners: Female   Lifestyle    Physical activity:     Days per week: Not on file     Minutes per session: Not on file    making.       Cristine Kocher, MD Pager: [phone]  Stroke, [facility]  [address] [place]  Electronically signed 5/17/2019 at 2:57 PM

## 2019-05-17 NOTE — CARE COORDINATION
5/17/19, 3:38 PM    DISCHARGE BARRIERS    Received call from Villard with UR-she heard back from EHR and patient meets in-patient as of 5/16/19. Keisha with [facility] updated. Family updated by RAQUEL Alberts.

## 2019-05-17 NOTE — PROGRESS NOTES
[address] [address]  SPEECH THERAPY  STRZ ONC MED 5K  Bedside Swallowing Evaluation      SLP Individual Minutes  Time In: 6083  Time Out: 9076  Minutes: 12  Timed Code Treatment Minutes: 0 Minutes       Date: 2019  Patient Name: Mikhail Shi      CSN: [id number]   : 1923  (80 y.o.)  Gender: male   Referring Physician:  Luther Severs, MD  Diagnosis: Stroke, acute embolic  Secondary Diagnosis:  Dysphagia  History of Present Illness/Injury: Patient admitted to [facility] with above dx. See physician H&P for full report. Per chart review, \"MBS completed 2015 recommending dental soft with nectar thick liquids. \"  ST consulted this hospital admission to complete swallow evaluation and determine safety of appropriate diet level  Past Medical History:   Diagnosis Date    Arthritis     Atrial flutter, paroxysmal ([address]    [employer] dual pacer programmed VVIR chronic at fib  2012    BPH (benign prostatic hyperplasia)     CAD (coronary artery disease)     Status post bypass    Cancer ([address]     CHF (congestive heart failure) (HCC)     Chronic kidney disease     Dyslipidemia     Hyperlipidemia     Movement disorder     Sick sinus syndrome ([address]     pace maker    Small bowel obstruction (HCC)        Pain:  0/10    Current Diet: General with thin     Respiratory Status: [x] Independent [] Nasal Cannula [] Oxygen Mask      [] Tracheostomy [] Other:     [] Ventilator/Settings:    Behavioral Observation: [x] Alert [x] Oriented [] Confused [] Lethargic      [] Dysarthric [] Limited Direction Following [] Agitated      [] Other:    ORAL MECHANISM EVALUATION:         Comments:  Facial / Labial [x]WFL [] Impaired []DNT    Lingual [x]WFL [] Impaired []DNT    Dentition [x]WFL [] Impaired []DNT    Velum []WFL [] Impaired [x]DNT    Vocal Quality []WFL [x] Impaired []DNT Intermittent wet vocal quality at baseline   Sensation []WFL [x] Impaired []DNT    Cough [x]WFL [] Impaired []DNT Strong   Other: []WFL [] Impaired []DNT    Other: []WFL [] Impaired []DNT        PATIENT WAS EVALUATED USING:  Thin via straw, puree, hard solids     ORAL PHASE: [] WFL  [x] Impaired   [] Loss of bolus from lips [] Impaired AP movement [] Pocketing Left   [] Pocketing Right  [] Lingual Pumping  [x] Impaired Mastication-suspect secondary to dentition    [] Reduced Bolus Formation [] Impaired Oral Initiation    [] OTHER:    PHARYNGEAL PHASE: [x] WFL: Pharyngeal phase appears WFLs, but cannot completely rule out pharyngeal phase deficits from a bedside swallow evaluation alone. [] Impaired   [] Delayed Swallow  [] Decreased Hyolaryngeal Elevation [] Audible Swallow   [] Suspected Pharyngeal Residue due to spontaneous multiple swallow. [] OTHER:    SIGNS AND SYMPTOMS OF LARYNGEAL PENETRATION / ASPIRATION:  [] NO sign/symptoms of aspiration evident with this evaluation, but cannot rule out silent aspiration. [] Throat Clear  [] Immediate Cough [x] Delayed Cough [x] Wet Vocal Quality  [] Change in Pulmonary Status  [] OTHER:    IMPRESSIONS: Patient presents with mild oropharygneal dysphagia as evidence by the findings outlined above. Patient with history of dysphagia; RN reports patient's family aware patient is at high risk for aspiration. Patient with intermittent wet vocal quality at baseline. Patient completed PO trials of thin liquids via straw, puree, hard solids and PO medication whole with water with fair-good success. Patient demonstrated with functional rotary chew, lingual coordination and timely swallow reflex with appropriate control, no immediate s/s of aspiration. Patient with c/o of \"mild sticking of cracker within teeth. \"  ST provided toothett to clear oral cavity; patient utilized appropriately. Significantly delayed cough noted; strong effective. RN reports clear and diminished lung sounds. Recommend dysphagia III diet with thin liquids, upright positioning and continued pulmonary monitoring.   Patient would greatly benefit from skilled ST services to target dysphagia needs. RECOMMENDATIONS:     Modified Barium Swallow: [] Is indicated to further assess    [x] Is NOT indicated at this time; Will recommend as        appropriate. DIET RECOMMENDATIONS:  Dysphagia III with thin liquids     STRATEGIES: [] Strategies pending MBS results. [x] Full upright position  [x] Small bite/sip [] No Straw [] Multiple Swallow  [] Chin tuck [] Head turn [x] Pulmonary monitoring [] Oral care after all meals  [] Supervision  [] Medication in applesauce []Direct 1:1 Supervision  [] Spoon all liquids [] Alternate solid / liquid [] Limit distractions [] Monitor for fatigue  [] PMV in place for all po [] OTHER:      EDUCATION:   Learner: [x]Patient [] Significant other [] Son/Daughter [] Parent     [] Other:   Education: [x] Reviewed results and recommendations of this evaluation     [x] Reviewed diet and strategies     [x] Reviewed signs, symptoms and risk of aspiration     [] Demonstrated how to thick liquid appropriately. [x] Reviewed goals and Plan of Care     [] OTHER:   Method: [x] Discussion [x] Demonstration [] Hand-out     [] OTHER:   Evaluation of Education:     [x] Verbalizes understanding [x] Demonstrates with assistance     [] Demonstrates without assistance [x]Needs further instruction     [] No evidence of learning  [x] Family not present    PATIENT GOALS: [] Pt did not state. Will further assess during treatment. [] Return to the least restricted diet possible     [x] Return to previous level of function     [] OTHER:    PLAN / TREATMENT RECOMMENDATIONS:  [x] Skilled SLP intervention on acute care 3-5 x per week or until goals met and/or pt plateaus in function.   Specific interventions for next session may include: dysphagia treatment     SHORT TERM GOALS:  Short-term Goals  Timeframe for Short-term Goals: 2 weeks  Goal 1: Patient will tolerate dysphagia III diet with thin liquids without s/s of aspiration in

## 2019-05-17 NOTE — FLOWSHEET NOTE
05/17/19 1545   Encounter Summary   Services provided to: Patient   Referral/Consult From: [facility]   Support System Spouse; Family members   Continue Visiting Yes  (5/17/19)   Complexity of Encounter Low   Length of Encounter 15 minutes   Routine   Type Initial   Assessment Calm; Approachable   Intervention Active listening;Nurtured hope   Outcome Comfort   Spiritual/Anglican   Type Spiritual support   During my contact with the 80 yr old patient, no family was present. The pt did stated that he had a wife and three children. The pt was hard of hearing and said that his wife has his hearing aids. 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 I offered emotional support and words of encouragement. Plan: Continued support would be helpful.

## 2019-05-17 NOTE — PROGRESS NOTES
Hospitalist Progress Note    Patient:  Candy Lunch      Unit/Bed:5K-25/025-A    YOB: 1923    MRN: [id number]       Acct: [de-identified]     PCP: Dahiana Weathers MD    Date of Admission: 5/16/2019    Assessment/Plan:    Acute right MCA stroke -presumed ischemic, cannot rule out if embolic   facial droop improved, left arm weakness improving, still has weakness , poor hand , cough while eating,  get PT and OT and SLP and neurology , get Lipid profile in am  Neurology consultation  Cannot do MRI because of a pacemaker  Continue aspirin, optimize blood pressure    Dysphagia possibly secondary to stroke versus Alzheimer's: On dysphagia diet    Essential hypertension: Fairly well controlled    Coronary artery disease status post bypass graft, without angina: Aspirin, statin, beta blocker    Chronic atrial fibrillation, status post pacemaker:  Currently not on Coumadin because of fall risk and multiple falls in the past  Continue metoprolol, rate controlled    Chronic kidney disease stage III: Creatinine close to baseline, hold her diuretic     Chronic diastolic congestive heart failure  : No obvious volume overload, continue metoprolol, Imdur     Physical debility, continue PT and OT    COPD without exacerbation: Bronchodilators as needed    Carpal tunnel syndrome bilateral-complains of no numbness in both the hands especially in the fingers     Lumbar canal stenosis    Severe Alzheimer's disease without behavioral disturbance, late onset  History of sundowning  History of small bowel obstruction  History of benign prostate hypertrophy    Expected discharge date:  Monday     Disposition:    [] Home       [] TCU       [] Rehab       [] Psych       [x] SNF       [] [facility]       [] Other-    Chief Complaint: Left upper extremity weakness, facial droop slurred speech     Patient was brought to the emergency room for further evaluation as patient had sudden onset of neck pain in the

## 2019-05-17 NOTE — PLAN OF CARE
Problem: DISCHARGE BARRIERS  Goal: Patient's continuum of care needs are met  Note:   From home with [facility] and family assistance. Possible ECF at discharge.

## 2019-05-18 PROBLEM — I63.511 ACUTE RIGHT MCA STROKE (HCC): Status: ACTIVE | Noted: 2019-01-01

## 2019-05-18 NOTE — PROGRESS NOTES
rehabilitation services?: Yes  Subjective: Pt resting in bed and agreeable to therapy. Pt [place] but quite motivated. General:       Vision: Within Functional Limits    Hearing: Exceptions to [facility]  Hearing Exceptions: Hard of hearing/hearing concerns, Bilateral hearing aid(Hearing aides at home )         Pain:   . Social/Functional History:    Lives With: Spouse(Pt's wife is 100)  Type of Home: House  Home Layout: Two level, Able to Live on Main level with bedroom/bathroom  Home Access: Stairs to enter with rails  Entrance Stairs - Rails: Right  Home Equipment: Rolling walker             ADL Assistance: Needs assistance(Pt reports having an aide who assists with bathing.  )     Ambulation Assistance: Independent  Transfer Assistance: Independent                 Objective:       RLE AROM: WFL         LLE AROM : WFL                                  Strength RLE: Exception  Comment: Grossly 4/t throughout     Strength LLE: Exception  Comment: Grossly 4/t throughout                                   Balance  Sitting - Static: Good  Standing - Static: Fair(with RW CGA x 1)    Supine to Sit: Minimal assistance(Min A to scoot to EOB )    Transfers  Sit to Stand: Contact guard assistance  Stand to sit: Contact guard assistance       Ambulation 1  Surface: level tile  Device: Rolling Walker  Assistance: Contact guard assistance  Quality of Gait: shuffling steps, forward flexed posture. Max cues for erect posture. Pt with increased difficulty holding onto RW with L UE. Pt to benefit from splint to assist with L UE . Distance: 5'x 1                                                  Exercises:  Comments: B LE ther ex 1 x 10 ankle pumps, heel slides, hip ABD, seated hip flexion and long arc quads. Ther ex performed to increase strength for functional mobility                                  Activity Tolerance:       Treatment Initiated: See there ex above.   Pt amb with RW 10'x 1 with CGA x.1 See gait deviations above. Assessment: Body structures, Functions, Activity limitations: Decreased functional mobility , Decreased strength  Assessment: Pt tolerated session well. Motiviated. Per pt, plan is for [facility] [facility] for further therapy. PT agrees with this plan for further skilled therapy prior to discharge home to increase strength and endurance. Prognosis: Good    Clinical Presentation: Low - Stable and Uncomplicated: Pt tolerated session well. Motiviated. Per pt, plan is for [employer] for further therapy. PT agrees with this plan for further skilled therapy prior to discharge home to increase strength and endurance. Decision Making: Low Complexitybased on patient assessment and decision making process of determining plan of care and establishing reasonable expectations for measurable functional outcomes    REQUIRES PT FOLLOW UP: Yes    Discharge Recommendations:  Discharge Recommendations: Continue to assess pending progress, Subacute/Skilled Nursing Facility    Patient Education:  Patient Education: POC    Equipment Recommendations:       Safety:  Type of devices: All fall risk precautions in place, Call light within reach, Gait belt, Left in chair, Patient at risk for falls, Chair alarm in place    Plan:  Times per week: 6xN  Times per day: Daily  Specific instructions for Next Treatment: ther ex, functional mobility   Current Treatment Recommendations: Strengthening, Balance Training, Functional Mobility Training, Transfer Training, Endurance Training, Patient/Caregiver Education & Training, Gait Training    Goals:  Patient goals : To return home   Short term goals  Time Frame for Short term goals:  At time of discharge   Short term goal 1: SBA x 1 with bed mobility so pt can get in and out of bed   Short term goal 2: SBA x 1 for t/fs so pt can get up to go to the bathroom  Short term goal 3: CGA x 1 to amb with RW 75'x 1 for household amb  Short term goal 4: Pt to tolerate 15 reps B LE ther ex to improve strength for functional mobility   Long term goals  Time Frame for Long term goals : No LTGs secondary to ELOS     Evaluation Complexity: Based on the findings of patient history, examination, clinical presentation, and decision making during this evaluation, the evaluation of Radha Butler  is of low complexity.             AM-PAC Inpatient Mobility without Stair Climbing Raw Score : 15  AM-PAC Inpatient without Stair Climbing T-Scale Score : 43.03  Mobility Inpatient CMS 0-100% Score: 47.43  Mobility Inpatient without Stair CMS G-Code Modifier : CK

## 2019-05-18 NOTE — PROGRESS NOTES
Hospitalist Progress Note    Patient:  Anabella Monzon      Unit/Bed:5K-25/025-A    YOB: 1923    MRN: [id number]       Acct: [de-identified]     PCP: Jey Harmon MD    Date of Admission: 5/16/2019    Assessment/Plan:    Acute right MCA stroke -presumed ischemic, cannot rule out if embolic   facial droop improved, left arm weakness improving, still has weakness , poor hand , cough while eating,  get PT and OT and SLP and neurology , get Lipid profile in am  Neurology consultation  Cannot do MRI because of a pacemaker  Continue aspirin, optimize blood pressure  5/18- appreciate neurology input, added Plavix for 3 weeks, continue PT and OT, and did okay with swallowing, on dysphagia 3 diet, blood pressure stable    Dysphagia possibly secondary to stroke versus Alzheimer's: On dysphagia diet    Essential hypertension: Fairly well controlled continue beta blocker    Coronary artery disease status post bypass graft, without angina: Aspirin, statin, beta blocker    Chronic atrial fibrillation, status post pacemaker:  Currently not on Coumadin because of fall risk and multiple falls in the past  Continue metoprolol, rate controlled  -Continue aspirin and Plavix    Chronic kidney disease stage III: Creatinine close to baseline, hold her diuretic   Mild increase in creatinine 1.8, on IV fluids 1 L, check bladder scan    Chronic diastolic congestive heart failure  : No obvious volume overload, continue metoprolol, Imdur     Physical debility, continue PT and OT    COPD without exacerbation: Bronchodilators as needed    Carpal tunnel syndrome bilateral-complains of no numbness in both the hands especially in the fingers     Lumbar canal stenosis    Severe Alzheimer's disease without behavioral disturbance, late onset- started on Aricept,  History of sundowning  History of small bowel obstruction  History of benign prostate hypertrophy    Expected discharge date:  Monday     Disposition:    [] Home Plavix for 3 weeks, started on Aricept, check B12, subclinical hypothyroidism with elevated TSH and normal free T4, mild increase in creatinine to 1.8, on gentle hydration 1 L ordered by neurology, check bladder scan    Subjective (past 24 hours):  Denies any chest pain or shortness of breath, states he is working on the weakness on the left hand, could not tell if he had a bowel movement,    Medications:  Reviewed    Infusion Medications    sodium chloride 100 mL/hr at 05/18/19 1014     Scheduled Medications    [START ON 5/19/2019] clopidogrel  75 mg Oral Daily    donepezil  5 mg Oral Nightly    pravastatin  40 mg Oral Nightly    aspirin EC  81 mg Oral Daily    docusate sodium  250 mg Oral Daily    isosorbide dinitrate  10 mg Oral TID    metoprolol tartrate  12.5 mg Oral BID    sodium chloride flush  10 mL Intravenous 2 times per day    enoxaparin  30 mg Subcutaneous Q24H    famotidine  20 mg Oral Daily     PRN Meds: sodium chloride flush, magnesium hydroxide, ondansetron, acetaminophen      Intake/Output Summary (Last 24 hours) at 5/18/2019 1348  Last data filed at 5/18/2019 1221  Gross per 24 hour   Intake 1197 ml   Output 1275 ml   Net -78 ml       Diet:  DIET DYSPHAGIA III ADVANCED;     Exam:  /61   Pulse 87   Temp 97.7 °F (36.5 °C) (Oral)   Resp 16   Ht 5' 6\" (1.676 m)   Wt 143 lb (64.9 kg)   SpO2 96%   BMI 23.08 kg/m²     Physical Examination:   General appearance - alert, awake  and in no distress at rest,   HEENT: Normocephalic, Atraumatic, pupils reactive, mucous membranes little dry  Chest - moving equally to respiration,symmetric air entry, clear to auscultation, PPM +  Heart - normal rate, regular rhythm, normal S1, S2, no murmurs  Abdomen - soft, nontender, nondistended, no masses or organomegaly, BS+  Neurological - alert, oriented to name  And place- hospital but not to time, normal speech, sensations intact and able to move all extremities , except has weakness in the left upper arm, poor hand   Extremities - peripheral pulses normal, no pedal edema,  Capillary refill less than 3 sec  Skin - normal coloration and turgor, no rashes        Labs:   Recent Labs     05/16/19  1544 05/17/19  0654   WBC 6.7 6.3   HGB 10.9* 11.0*   HCT 35.2* 35.1*    201     Recent Labs     05/16/19  1544 05/17/19  0654 05/18/19  0707    141 142   K 4.5 4.4 4.0    103 102   CO2 29 26 26   BUN 38* 35* 34*   CREATININE 1.7* 1.7* 1.8*   CALCIUM 9.1 8.8 8.8     Recent Labs     05/16/19  1544   AST 15   ALT <5*   BILITOT 0.4   ALKPHOS 80     Recent Labs     05/16/19  1544   INR 1.07     No results for input(s): Luis Ya in the last 72 hours. Microbiology:      Urinalysis:      Lab Results   Component Value Date    NITRU Negative 10/02/2013    WBCUA 0-2 03/22/2013    BACTERIA NONE 03/22/2013    RBCUA 0-2 03/22/2013    BLOODU neg 04/08/2015    BLOODU Negative 10/02/2013    SPECGRAV 1.026 03/22/2013    GLUCOSEU neg 04/08/2015    GLUCOSEU neg 10/02/2013       Radiology:  CT HEAD WO CONTRAST (CODE STROKE)   Final Result   No acute intracranial findings. **This report has been created using voice recognition software. It may contain minor errors which are inherent in voice recognition technology. **      Final report electronically signed by Dr. Myah Goff on 5/16/2019 3:05 PM          DVT prophylaxis: [x] Lovenox                                 [] SCDs                                 [] SQ Heparin                                 [] Encourage ambulation           [] Already on Anticoagulation     Code Status: DNR-CC    PT/OT Eval Status: ordered    Tele:   [x] yes             [] no    Active Hospital Problems    Diagnosis Date Noted    History of atrial fibrillation [Z86.79]     Stroke [facility]) [I63.9] 05/16/2019       Electronically signed by Dennison Dakin, MD on 5/18/2019 at 1:48 PM

## 2019-05-18 NOTE — CONSULTS
NEUROLOGY CONSULT NOTE       Requesting Physician: Renay Henning MD     Reason for Consult:  Evaluate for acute stroke    History of Present Illness:  Candy Hatfield is a 80 y.o. male admitted to [address] [place] [address] on 5/16/2019. On that date he developed pain, which per ER was in neck, but he states was in posterior shoulder area, then dysarthria, drooling, and LUE weakness. Since onset the weakness has improved. No sensory or visual sxs. Hx somewhat limited d/t patient so hard of hearing. Chronic numbness of medial fingers. He feels he is doing \"pretty good for an old man\". He reportedly has \"advanced Alzheimers\" and had been on Hospice, but this revoked for this admission. He wife, ([place] y/o), is no longer able to adequately care for him, and family has been looking into assisted living. He tells me he uses a four wheeled walker at home. Hx atrial fib/flutter, with Coumadin recently DC'd d/t falls.     Past Medical History:        Diagnosis Date    Arthritis     Atrial flutter, paroxysmal [facility])    [employer] dual pacer programmed VVIR chronic at [employer]  5/29/2012    BPH (benign prostatic hyperplasia)     CAD (coronary artery disease)     Status post bypass    Cancer ([address]     CHF (congestive heart failure) ([facility])     Chronic kidney disease     Dyslipidemia     Hyperlipidemia     Movement disorder     Sick sinus syndrome ([facility])     pace maker    Small bowel obstruction ([address]      Diastolic dysfunction    He states he had some kind of blood clot under his arm for which he had been anticoagulated      Procedure Laterality Date    CHOLECYSTECTOMY  11/15/2004    Dr Luis Lu    COLONOSCOPY  9/04/2009    Dr Ramirez Santiago, COLON, DIAGNOSTIC      EYE SURGERY      JOINT REPLACEMENT      both hips    PACEMAKER INSERTION  2012    SKIN CANCER EXCISION      UPPER GASTROINTESTINAL ENDOSCOPY  10/05/2007    Dr Marie Sexton History:  Social History     Tobacco Use   Smoking Status Former Smoker    Types: Cigarettes    Last attempt to quit: 1993    Years since quittin.1   Smokeless Tobacco Former User     Social History     Substance and Sexual Activity   Alcohol Use No     Social History     Substance and Sexual Activity   Drug Use No         Family History:       Problem Relation Age of Onset    Early Death Mother     Heart Disease Brother     Cancer Brother     Heart Disease Father     Cancer Sister     Arthritis Paternal Aunt     Other Paternal Aunt        Review of Systems:  CONSTITUTIONAL:  negative for fever or recent illness  EYE:  No recent visual changes  ENT:  Very hard of hearing with hearing aids at home. Hx of nose fracture and wears some kind of nose strip. RESPIRATORY:  Chronic dyspnea  CARDIOVASCULAR:  negative for chest pain  GASTROINTESTINAL:  negative for nausea or abdominal pain  HEMATOLOGIC/LYMPHATIC:  negative for unusual bleeding  MUSCULOSKELETAL:  No further neck or shoulder pain  SKIN: shoulders feel itchy  GENITOURINARY: negative for urinary difficulties  NEUROLOGIC:  see HPI    Allergies:     Allergies   Allergen Reactions    Potassium Chloride Er Other (See Comments)     Chokes on 20 meq tablets        Current Medications:     0.9 % sodium chloride infusion Continuous   [START ON 2019] clopidogrel (PLAVIX) tablet 75 mg Daily   clopidogrel (PLAVIX) tablet 225 mg Once   donepezil (ARICEPT) tablet 5 mg Nightly   pravastatin (PRAVACHOL) tablet 40 mg Nightly   aspirin EC tablet 81 mg Daily   docusate sodium (COLACE) 150 MG/15ML liquid 250 mg Daily   isosorbide dinitrate (ISORDIL) tablet 10 mg TID   metoprolol tartrate (LOPRESSOR) tablet 12.5 mg BID   sodium chloride flush 0.9 % injection 10 mL 2 times per day   sodium chloride flush 0.9 % injection 10 mL PRN   magnesium hydroxide (MILK OF MAGNESIA) 400 MG/5ML suspension 30 mL Daily PRN   ondansetron (ZOFRAN) injection 4 mg Q6H PRN enoxaparin (LOVENOX) injection 30 mg Q24H   famotidine (PEPCID) tablet 20 mg Daily   acetaminophen (TYLENOL) tablet 650 mg Q4H PRN        Medications Prior to Admission: pravastatin (PRAVACHOL) 40 MG tablet, TAKE 1 TABLET BY MOUTH ONE TIME A DAY in the evening  metoprolol tartrate (LOPRESSOR) 25 MG tablet, Take 0.5 tablets by mouth 2 times daily  isosorbide dinitrate (ISORDIL) 10 MG tablet, Take 1 tablet by mouth 3 times daily  furosemide (LASIX) 40 MG tablet, Take 1 tablet by mouth daily One po q day prn leg edema (Patient taking differently: Take 40 mg by mouth daily )  potassium chloride (KLOR-CON) 8 MEQ extended release tablet, Take 1 tablet by mouth 2 times daily  aspirin EC 81 MG EC tablet, Take 1 tablet by mouth daily  senna (SENOKOT) 8.6 MG tablet, Take 1 tablet by mouth nightly  docusate sodium (COLACE) 250 MG capsule, Take 250 mg by mouth daily. Handicap Placard MISC, by Does not apply route Duration 5 years Exp:  12/18/2023 Dx:  COPD and CHF  Handicap Placard MISC, by Does not apply route Duration 5 years Exp:  12/18/2023 Dx:  COPD and CHF  Acetaminophen (TYLENOL ARTHRITIS PAIN PO), Take by mouth    Physical Exam:  /61   Pulse 87   Temp 97.7 °F (36.5 °C) (Oral)   Resp 16   Ht 5' 6\" (1.676 m)   Wt 143 lb (64.9 kg)   SpO2 96%   BMI 23.08 kg/m²  I Body mass index is 23.08 kg/m². I Wt Readings from Last 1 Encounters:   05/16/19 143 lb (64.9 kg)          GENERAL: he is in no apparent distress; lively for age; cooperative  EYE:  Fundi not adequately seen  CARDIOVASCULAR:  Heart rhythm irregular with a normal rate. No carotid bruits detected.   NEUROLOGIC:  Level of Alertness: alert  Orientation: oriented to person, place and time  Memory and Fund of Knowledge:  While he was able to tell me it's 6 days until his birthday, and how old his wife is, he could not tell me the name of the President  Attention/Concentration: normal  Language: no aphasia other than some word finding difficulty attributable to dementia  Cranial Nerves: pupils are equal; extraocular muscles intact; facial sensation intact; very slight left lower facial weakness; extremely hard of hearing; the palate raises midline, and the tongue protrudes midline; shoulder shrug is symmetric  Motor Exam: Normal tone in all extremities. Strength is MRC grade 5 RUE and 4 LUE. Atrophy of hand muscles bilaterally. LE strength seems symmetric, but when he walks seems he might be a little weaker on left. Sensory: Sensory symmetric to light touch proximal UEs  Coordination: No dysmetria  Deep Tendon Reflexes: Not clearly evoked  Abnormal movements: none  Station and gait:  Able to get up with assistance. Took several steps but needed min assist.  Stooped posture. Diagnostics:  CBC: Lab Results   Component Value Date    WBC 6.3 05/17/2019    RBC 3.74 05/17/2019    RBC 3.85 05/09/2012    HGB 11.0 05/17/2019    HCT 35.1 05/17/2019    MCV 93.9 05/17/2019    MCH 29.4 05/17/2019    MCHC 31.3 05/17/2019    RDW 14.7 04/30/2018     05/17/2019    MPV 9.7 05/17/2019     CMP:  Lab Results   Component Value Date     05/18/2019    K 4.0 05/18/2019    K 4.4 05/17/2019     05/18/2019    CO2 26 05/18/2019    BUN 34 05/18/2019    CREATININE 1.8 05/18/2019    CREATININE 1.6 02/07/2015    LABGLOM 35 05/18/2019    GLUCOSE 89 05/18/2019    GLUCOSE 99 05/09/2012    PROT 6.8 05/16/2019    PROT 6.4 02/24/2015    LABALBU 3.4 05/16/2019    CALCIUM 8.8 05/18/2019    BILITOT 0.4 05/16/2019    ALKPHOS 80 05/16/2019    AST 15 05/16/2019    ALT <5 05/16/2019     PT/INR:    Lab Results   Component Value Date    PROTIME 21.1 01/28/2016    INR 1.07 05/16/2019     LDL 67, HDL 52, triglycerides 71    CT brain images reviewed - some atrophy and small vessel disease      Impression:  Acute stroke.   Without CTA or other studies, we don't know if this is cardioembolic or thrombotic, but if he is felt not to be a candidate for anticoagulation, this does not

## 2019-05-19 NOTE — PROGRESS NOTES
Hospitalist Progress Note    Patient:  Ainsley Casas      Unit/Bed:5K-25/025-A    YOB: 1923    MRN: [id number]       Acct: [de-identified]     PCP: Yolanda Martins MD    Date of Admission: 5/16/2019    Assessment/Plan:    Acute right MCA stroke -presumed ischemic, cannot rule out if embolic   facial droop improved, left arm weakness improving, still has weakness , poor hand , cough while eating,  get PT and OT and SLP and neurology , get Lipid profile in am  Neurology consultation  Cannot do MRI because of a pacemaker  Continue aspirin, optimize blood pressure  5/18- appreciate neurology input, added Plavix for 3 weeks, continue PT and OT, and did okay with swallowing, on dysphagia 3 diet, blood pressure stable  5/19- still weak in left upper, little better overall    Dysphagia possibly secondary to stroke versus Alzheimer's: On dysphagia diet    Essential hypertension: Fairly well controlled continue beta blocker    Coronary artery disease status post bypass graft, without angina: Aspirin, statin, beta blocker    Chronic atrial fibrillation, status post pacemaker:  Currently not on Coumadin because of fall risk and multiple falls in the past  Continue metoprolol, rate controlled  -Continue aspirin and Plavix    Chronic kidney disease stage III: Creatinine close to baseline, hold her diuretic   Mild increase in creatinine 1.8, on IV fluids 1 L, check bladder scan    Chronic diastolic congestive heart failure  : No obvious volume overload, continue metoprolol, Imdur     Physical debility, continue PT and OT    COPD without exacerbation: Bronchodilators as needed    Carpal tunnel syndrome bilateral-complains of no numbness in both the hands especially in the fingers   try Neurontin 100 mg at night to see I it helps with neuropathy      Lumbar canal stenosis    Severe Alzheimer's disease without behavioral disturbance, late onset- started on Aricept,  History of sundowning  History of small bowel obstruction  History of benign prostate hypertrophy    Expected discharge date:  Monday     Disposition:    [] Home       [] TCU       [] Rehab       [] Psych       [x] SNF       [] [facility]       [] Other-    Chief Complaint: Left upper extremity weakness, facial droop slurred speech     Patient was brought to the emergency room for further evaluation as patient had sudden onset of neck pain in the back in the afternoon while he was walking with drooling and slurred speech and wife had tough time understanding him. Patient could not move his left arm. Did not have any weakness in the lower extremity did not have any fall. Patient was diagnosed with severe dementia and was transitioned to hospice 3 weeks ago by PCP and since patient was fairly independent with activities except feeding and bathing, he was at home. Apparently last Saturday while family members were present, he did have brief slurred speech and the family members didn't think of a stroke until this happened. Family members revoke the hospice and brought him to the hospital for further evaluation    Hospital Course: CT scan of the head did not show acute stroke or bleed and MRI could not be done because of the pacemaker. Overnight patient's symptoms especially speech is better and still has weakness in the left upper extremity. Has cough with eating and swallowing evaluation was done and is placed on dysphagia diet currently . Will get PT and OT evaluation, will not do aggressive workup especially 2-D echo, will get neurology opinion to see if the patient would benefit from Plavix especially as the patient has A. fib and could not take full anticoagulation because of history of falls . Most likely will need placement in a nursing home .     5/18- patient pleasantly confused with time, able to maintain conversation, states that he is working on his left hand weakness, still has difficulty with hand , needing help with activities and also getting him up, didn't tolerate food, on dysphagia diet, appreciate neurology input, continue PT and OT, Plavix for 3 weeks, started on Aricept, check B12, subclinical hypothyroidism with elevated TSH and normal free T4, mild increase in creatinine to 1.8, on gentle hydration 1 L ordered by neurology, check bladder scan    5/19- pulled out IV access, creatinine 1.6 close to baseline, complaining of numbness and tingling in the fingers from carpal tunnel, try Neurontin 100 mg nightly and if tolerating gradually increase the dose, B12 level is good, still has weakness in the left upper extremity and needing one assist, possible discharge tomorrow    Subjective (past 24 hours):  Patient feels that the left arm strength is better and he thinks his symptoms are most likely from carpal tunnels   Admits to having numbness and tingling in the medial 2 fingers on the left side and L3 on the right side, tolerating food,    Medications:  Reviewed    Infusion Medications     Scheduled Medications    gabapentin  100 mg Oral Nightly    clopidogrel  75 mg Oral Daily    donepezil  5 mg Oral Nightly    pravastatin  40 mg Oral Nightly    aspirin EC  81 mg Oral Daily    docusate sodium  250 mg Oral Daily    isosorbide dinitrate  10 mg Oral TID    metoprolol tartrate  12.5 mg Oral BID    sodium chloride flush  10 mL Intravenous 2 times per day    enoxaparin  30 mg Subcutaneous Q24H    famotidine  20 mg Oral Daily     PRN Meds: sodium chloride flush, magnesium hydroxide, ondansetron, acetaminophen      Intake/Output Summary (Last 24 hours) at 5/19/2019 1630  Last data filed at 5/19/2019 1353  Gross per 24 hour   Intake 620 ml   Output 275 ml   Net 345 ml       Diet:  DIET DYSPHAGIA III ADVANCED;     Exam:  BP (!) 130/58   Pulse 81   Temp 97.7 °F (36.5 °C) (Oral)   Resp 16   Ht 5' 6\" (1.676 m)   Wt 143 lb (64.9 kg)   SpO2 97%   BMI 23.08 kg/m²     Physical Examination:   General appearance - alert, awake  and in no distress at rest,   HEENT: Normocephalic, Atraumatic, pupils reactive, mucous membranes little dry  Chest - moving equally to respiration,symmetric air entry, clear to auscultation, PPM +  Heart - normal rate, regular rhythm, normal S1, S2, no murmurs  Abdomen - soft, nontender, nondistended, no masses or organomegaly, BS+  Neurological - alert, oriented to name  And place- hospital but not to time, normal speech, sensations intact and able to move all extremities , except has weakness in the left upper arm, poor hand   Admits to numbness and tingling in the fingers  Extremities - peripheral pulses normal, no pedal edema,  Capillary refill less than 3 sec  Skin - normal coloration and turgor, no rashes        Labs:   Recent Labs     05/17/19  0654   WBC 6.3   HGB 11.0*   HCT 35.1*        Recent Labs     05/17/19  0654 05/18/19  0707 05/19/19  0700    142 141   K 4.4 4.0 4.1    102 104   CO2 26 26 26   BUN 35* 34* 30*   CREATININE 1.7* 1.8* 1.6*   CALCIUM 8.8 8.8 8.9     No results for input(s): AST, ALT, BILIDIR, BILITOT, ALKPHOS in the last 72 hours. No results for input(s): INR in the last 72 hours. No results for input(s): Victorine Alexahua in the last 72 hours. Microbiology:      Urinalysis:      Lab Results   Component Value Date    NITRU Negative 10/02/2013    WBCUA 0-2 03/22/2013    BACTERIA NONE 03/22/2013    RBCUA 0-2 03/22/2013    BLOODU neg 04/08/2015    BLOODU Negative 10/02/2013    SPECGRAV 1.026 03/22/2013    GLUCOSEU neg 04/08/2015    GLUCOSEU neg 10/02/2013       Radiology:  CT HEAD WO CONTRAST (CODE STROKE)   Final Result   No acute intracranial findings. **This report has been created using voice recognition software. It may contain minor errors which are inherent in voice recognition technology. **      Final report electronically signed by Dr. Sisi Cabrera on 5/16/2019 3:05 PM          DVT prophylaxis: [x] Lovenox [] SCDs                                 [] SQ Heparin                                 [] Encourage ambulation           [] Already on Anticoagulation     Code Status: DNR-CC    PT/OT Eval Status: ordered    Tele:   [x] yes             [] no    Active Hospital Problems    Diagnosis Date Noted    Chronic atrial fibrillation ([facility] [I48.2]     Weakness of left upper extremity [R29.898]     Oropharyngeal dysphagia [R13.12]     History of atrial fibrillation [Z86.79]     Acute right MCA stroke ([facility] [I63.511] 05/16/2019    Stage 3 chronic kidney disease ([facility] [N18.3] 03/21/2013       Electronically signed by Cecelia Mccollum MD on 5/19/2019 at 4:30 PM

## 2019-05-19 NOTE — PLAN OF CARE
Problem: Falls - Risk of:  Goal: Will remain free from falls  Description  Will remain free from falls  5/19/2019 1204 by Zak Chun RN  Outcome: Ongoing  Note:   No fall this shift, continues to work with therapy. Out of bed with one assist, up to chair currently with alarm on. Problem: Risk for Impaired Skin Integrity  Goal: Tissue integrity - skin and mucous membranes  Description  Structural intactness and normal physiological function of skin and  mucous membranes. 5/19/2019 1204 by Zak Chun RN  Outcome: Ongoing  Note:   No new skin issues. Problem: Pain:  Goal: Pain level will decrease  Description  Pain level will decrease  5/19/2019 1204 by Zak Chun RN  Outcome: Ongoing  Note:   C/O pain in left hand and thumb, treating with tylenol, patient appears satisfied. Problem: Discharge Planning:  Goal: Participates in care planning  Description  Participates in care planning  5/19/2019 1204 by Zak Chun RN  Outcome: Ongoing  Note:   [place] [facility] [place]     Problem: Musculor/Skeletal Functional Status  Goal: Highest potential functional level  5/19/2019 1204 by Zak Chun RN  Outcome: Ongoing  Note:   Weakness to Left hand. All other pedal push/pull, right hand grasp moderate. Care plan reviewed with patient. Patient verbalizes understanding of the plan of care and contributes to goal setting.

## 2019-05-19 NOTE — PLAN OF CARE
Problem: Falls - Risk of:  Goal: Will remain free from falls  Description  Will remain free from falls  5/18/2019 2347 by Sudeep Shaikh RN  Outcome: Ongoing  Note:   Patient free from falls this shift. Patient ambulates with walker and one assist.  Bed alarm on for patient safety. Problem: Discharge Planning:  Goal: Participates in care planning  Description  Participates in care planning  5/18/2019 2347 by Sudeep Shaikh RN  Outcome: Ongoing  Note:   Patient plans to go to [address] at discharge. Care plan reviewed with patient. Patient verbalizes understanding of the plan of care and contribute to goal setting.

## 2019-05-19 NOTE — PROGRESS NOTES
Lashawn Feldman 60  INPATIENT OCCUPATIONAL THERAPY  [facility] ONC MED 5K  EVALUATION    Time:  Time In: 3288  Time Out: 4367  Timed Code Treatment Minutes: 8 Minutes  Minutes: 19          Date: 2019  Patient Name: Rob Engel,   Gender: male      MRN: [id number]  : 1923  (89 y.o.)  Referring Practitioner: Olya Hernandez MD  Diagnosis: Stroke, Acute Embolic  Additional Pertinent Hx: Rob Engel is a 80 y.o. male who presents to the Emergency Department with a medical history of CAD and CHF for the evaluation of left upper extremity weakness. The patient's family states that the patient developed sudden onset of posterior neck pain around 1200 this afternoon which was followed by onset of drooling and dysarthria. Patient's family revoked his hospice and the patient was brought to the ED for evaluation. He denies any chest pain, abdominal pain, back pain, or lower extremity pain. Patient is a DNR-CC. Patient's family reports no additional complaints at this time.      Restrictions/Precautions:  General Precautions, Fall Risk                            Past Medical History:   Diagnosis Date    Arthritis     Atrial flutter, paroxysmal [facility])     Hubblr dual pacer programmed VVIR chronic at fib  2012    BPH (benign prostatic hyperplasia)     CAD (coronary artery disease)     Status post bypass    Cancer ([address]     CHF (congestive heart failure) (HCC)     Chronic kidney disease     Dyslipidemia     Hyperlipidemia     Movement disorder     Sick sinus syndrome ([address]     pace maker    Small bowel obstruction ([address]      Past Surgical History:   Procedure Laterality Date    CHOLECYSTECTOMY  11/15/2004    Dr Marilyn Ribeiro  2009    Dr Sylvia Bang, COLON, DIAGNOSTIC      EYE SURGERY      JOINT REPLACEMENT      both hips    PACEMAKER INSERTION  2012    SKIN CANCER EXCISION      UPPER GASTROINTESTINAL ENDOSCOPY  10/05/2007 Dr Hayden Powell: Yes  Patient assessed for rehabilitation services?: Yes  Family / Caregiver Present: No    Subjective: Pt sitting EOB, agreeable to OT session, very pleasant    General:  Overall Orientation Status: Within Functional Limits    Vision: Within Functional Limits    Hearing: Exceptions to [facility]  Hearing Exceptions: Hard of hearing/hearing concerns, Bilateral hearing aid         Pain:  Pain Assessment  Patient Currently in Pain: Denies       Social/Functional History:  Lives With: Spouse(spouse is 8 years old)  Type of Home: House  Home Layout: Two level, Able to Live on Main level with bedroom/bathroom  Home Access: Stairs to enter with rails  Entrance Stairs - Rails: Right  Home Equipment: Rolling walker     Bathroom Shower/Tub: Walk-in shower  Bathroom Toilet: Standard       ADL Assistance: Needs assistance  Homemaking Assistance: Needs assistance  Homemaking Responsibilities: No    Ambulation Assistance: Independent  Transfer Assistance: Independent          Additional Comments: Aide 2x/wk for bathing, pt dresses on own,  1x/wk.     Objective        Overall Cognitive Status: Exceptions  Cognition Comment: slow processing at times                                     LUE AROM (degrees)  LUE AROM : Exceptions  LUE General AROM: decreased B shoulder flexion and decreased hand/wrist ROM--decreased  strength          RUE AROM (degrees)  RUE AROM : Exceptions  RUE General AROM: decreased B shoulder flexion       LUE Strength  LUE Strength Comment: 4-/5                RUE Strength  RUE Strength Comment: 4-/5              ADL  LE Dressing: Minimal assistance(socks)          Transfers  Sit to stand: Contact guard assistance  Stand to sit: Contact guard assistance    Balance  Sitting Balance: Supervision(EOB)  Standing Balance: Contact guard assistance     Time: in prep for mobility x 1 min     Functional Mobility  Functional - Mobility Device: Rolling Walker  Activity: Other  Assist Level: Contact guard assistance  Functional Mobility Comments: EOB to recliner, difficulty keeping L hand on walker           Activity Tolerance:  Activity Tolerance: Patient Tolerated treatment well  Activity Tolerance: Treatment initiated, eval completed: pt completed t/fs and mobility as detailed above. Pt tangential and easily distracted with conversation requiring increased time for simple tasks. Assessment:  Assessment: Pt s/p stroke-like symptoms. Pt exhibiting deficits detailed below, requiring additional OT intervention to restore PLOF. Pt now requiring assist for ADLs, IADLs, mobility and t/fs. Pt with significant increase in burden of care. Without skilled OT intervention pt at risk for functional decline, increased falls, and readmission to hospital.    Performance deficits / Impairments: Decreased functional mobility , Decreased strength, Decreased endurance, Decreased balance, Decreased ADL status, Decreased cognition  Prognosis: Good    Clinical Decision Making: Clinical Decision making was of Moderate Complexity as the result of analysis of data from a detailed assessment, a consideration of several treatment options, the presence of comorbidities affecting the plan of care and the need for minimal to moderate modifications or assistance required to complete the evaluation. Discharge Recommendations:  Subacute/Skilled Nursing Facility    Patient Education:  Patient Education: OT POC, continuum of care    Equipment Recommendations: Other: defer to next level of care    Safety:  Safety Devices in place: Yes  Type of devices:  All fall risk precautions in place, Gait belt, Call light within reach, Patient at risk for falls, Chair alarm in place, Left in chair       Plan:  Times per week: 5x  Current Treatment Recommendations: Strengthening, Functional Mobility Training, Endurance Training, Balance Training, Safety Education & Training, Self-Care / ADL,

## 2019-05-20 NOTE — FLOWSHEET NOTE
05/20/19 1217   Encounter Summary   Services provided to: Patient   Referral/Consult From: [facility] Family members   Continue Visiting No  (5/20/19)   Complexity of Encounter Low   Length of Encounter 15 minutes   Spiritual/Evangelical   Type Spiritual support   Assessment Approachable   Intervention Active listening;Nurtured hope   Outcome Comfort;Engaged in conversation   During my follow up contact with the patient, there were no family members present. The pt is hard of hearing and has family support. The pt engaged in conversation and shared stories about his life and how he was blessed. I offered emotional support along with words of encouragement. Plan: No follow up is needed at this time. The pt is scheduled to be discharge and going to [address]

## 2019-05-20 NOTE — DISCHARGE INSTR - COC
Continuity of Care Form    Patient Name: Leonard Chung   :  1923  MRN:  [id number]    Admit date:  2019  Discharge date:  19    Code Status Order: [facility]   Advance Directives:   [facility] Documentation     Date/Time Healthcare Directive Type of Healthcare Directive Copy in [address] Agent's Name Healthcare Agent's Phone Number    [phone]  Yes, patient has an advance directive for healthcare treatment  Durable power of  for health care;Living will  No, copy requested from family  Healthcare power of   Treasure Hortonick  [phone]          Admitting Physician:  Jese Garcia DO  PCP: Irina Poole MD    Discharging Nurse: Chuy Puri [facility] Unit/Room#: 5K-25/025-A  Discharging Unit Phone Number: [phone]    Emergency Contact:   Extended Emergency Contact Information  Primary Emergency Contact: Treasure Richmond [facility] [address]  Phone: [phone]  Relation: Child  Secondary Emergency Contact: Tevin Flynn  Address: [address] [place] [address], [address] [place] [address] [address] Phone: [phone]  Relation: Spouse    Past Surgical History:  Past Surgical History:   Procedure Laterality Date    CHOLECYSTECTOMY  11/15/2004    Dr Neto Salas  2009    Dr Griffith Bachelor, COLON, DIAGNOSTIC      EYE SURGERY      JOINT REPLACEMENT      both hips    PACEMAKER INSERTION      SKIN CANCER EXCISION      UPPER GASTROINTESTINAL ENDOSCOPY  10/05/2007    Dr Sheri Cockayne       Immunization History:   Immunization History   Administered Date(s) Administered    Influenza Vaccine, unspecified formulation 10/17/2015    Influenza Whole 10/01/2012    Influenza, High Dose (Fluzone 72 yrs and older) 10/29/2018    Influenza, Shirin Kilts, 3 Years and older, IM (Fluzone 3 yrs and older or Afluria 5 yrs and older) 10/30/2017    Pneumococcal Polysaccharide ([id number]) 01/11/2016       Active Problems:  Patient Active Problem List   Diagnosis Code    CAD (coronary artery disease) I25.10    Dyslipidemia E78.5   Sportcut dual pacer programmed VVIR chronic at fib  Z95.0    Stage 3 chronic kidney disease ([facility] N18.3    CHF with left ventricular diastolic dysfunction, NYHA class 2 ([facility]) I50.30    HTN (hypertension) I10    TIA (transient ischemic attack) G45.9    Osteoarthritis M19.90    Lumbar spinal stenosis M48.061    COPD (chronic obstructive pulmonary disease) ([facility]) J44.9    Aspiration into airway T17.908A    Blepharitis of right eye H01.003    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2    Weakness of left upper extremity R29.898    Oropharyngeal dysphagia R13.12       Isolation/Infection:   Isolation          No Isolation            Nurse Assessment:  Last Vital Signs: /62   Pulse 81   Temp 98.3 °F (36.8 °C) (Oral)   Resp 16   Ht 5' 6\" (1.676 m)   Wt 143 lb (64.9 kg)   SpO2 93%   BMI 23.08 kg/m²     Last documented pain score (0-10 scale): Pain Level: 0  Last Weight:   Wt Readings from Last 1 Encounters:   05/16/19 143 lb (64.9 kg)     Mental Status:  alert    IV Access:  - None    Nursing Mobility/ADLs:  Walking   Dependent  Transfer  Assisted  Bathing  Assisted  Dressing  Assisted  Toileting  Assisted  Feeding  Independent  Med Admin  Assisted  Med Delivery   whole    Wound Care Documentation and Therapy:        Elimination:  Continence:   · Bowel: Yes  · Bladder: Yes  Urinary Catheter: None   Colostomy/Ileostomy/Ileal Conduit: No       Date of Last BM: 5/20/19    Intake/Output Summary (Last 24 hours) at 5/20/2019 0732  Last data filed at 5/19/2019 2231  Gross per 24 hour   Intake 460 ml   Output 525 ml   Net -65 ml     I/O last 3 completed shifts: In: 26 [P.O.:460]  Out: 525 [Urine:525]    Safety Concerns:     Sundowners [place] and At Risk for Falls    Impairments/Disabilities:      Vision and Hearing    Nutrition Therapy:  Current Nutrition Therapy:   - Oral Diet:  Dysphagia 3 advanced    Routes of Feeding: Oral  Liquids: Thin Liquids  Daily Fluid Restriction: no  Last Modified Barium Swallow with Video (Video Swallowing Test): not done    Treatments at the Time of Hospital Discharge:   Respiratory Treatments: ***  Oxygen Therapy:  is not on home oxygen therapy.   Ventilator:    - No ventilator support    Rehab Therapies: Physical Therapy and Occupational Therapy  Weight Bearing Status/Restrictions: No weight bearing restirctions  Other Medical Equipment (for information only, NOT a DME order):  walker  Other Treatments: ***    Patient's personal belongings (please select all that are sent with patient):  discharged with all belongings     RN SIGNATURE:  Electronically signed by Clement Bingham RN on 5/20/19 at 12:36 PM    CASE MANAGEMENT/SOCIAL WORK SECTION    Inpatient Status Date: 5/16/19    Readmission Risk Assessment Score:  Readmission Risk              Risk of Unplanned Readmission:        17           Discharging to Facility/ Agency   · Name: [facility]  · Address: [address] [place] [address]  · Phone: [phone]  · Fax: [phone]    Dialysis Facility (if applicable)   · Name:  · Address:  · Dialysis Schedule:  · Phone:  · Fax:    / signature: Electronically signed by NAHOMY Reilly on 5/20/19 at 7:34 AM    PHYSICIAN SECTION    Prognosis: Guarded    Condition at Discharge: Stable    Rehab Potential (if transferring to Rehab): Fair    Recommended Labs or Other Treatments After Discharge:     Physician Certification: I certify the above information and transfer of Andrew Angel  is necessary for the continuing treatment of the diagnosis listed and that he requires Abrahan Valentine for greater 30 days.      Update Admission H&P: No change in H&P    PHYSICIAN SIGNATURE:  Electronically signed by Manda Avery MD on 5/20/19 at 11:41 AM

## 2019-05-20 NOTE — PLAN OF CARE
Problem: Falls - Risk of:  Goal: Will remain free from falls  Description  Will remain free from falls  5/19/2019 2327 by Tawana August RN  Outcome: Ongoing  Note:   Patient free from falls this shift. Patient ambulates with walker and one to two assist.  Patient is impulsive and has poor safety awareness. Bed alarm on for patient safety. Problem: DISCHARGE BARRIERS  Goal: Patient's continuum of care needs are met  5/19/2019 2327 by Tawana August RN  Outcome: Ongoing  Note:   Patient plans to be discharged to [facility]. Care plan reviewed with patient. Patient verbalizes understanding of the plan of care and contribute to goal setting.

## 2019-05-20 NOTE — PROGRESS NOTES
[address]  Occupational Therapy  Daily Note  Time:  Time In: 4013  Time Out: 1239  Timed Code Treatment Minutes: 23 Minutes  Minutes: 23          Date: 2019  Patient Name: Rob Engel,   Gender: male      Room: [facility]/025-A  MRN: [id number]  : 1923  (89 y.o.)  Referring Practitioner: Olya Hernandez MD  Diagnosis: Stroke, Acute Embolic  Additional Pertinent Hx: Rob Engel is a 80 y.o. male who presents to the Emergency Department with a medical history of CAD and CHF for the evaluation of left upper extremity weakness. The patient's family states that the patient developed sudden onset of posterior neck pain around 1200 this afternoon which was followed by onset of drooling and dysarthria. Patient's family revoked his hospice and the patient was brought to the ED for evaluation. He denies any chest pain, abdominal pain, back pain, or lower extremity pain. Patient is a DNR-CC. Patient's family reports no additional complaints at this time. Restrictions/Precautions:  Restrictions/Precautions: General Precautions, Fall Risk    SUBJECTIVE: In bed upon arrival, agreeable to OT session, very [place]    PAIN: 2/10: back    COGNITION: Decreased Attention Span and Decreased Insight    ADL:   Not completed during session. BALANCE:  Sitting:  Not tested. Standing: Contact Guard Assistance. approx 1 minute with 1 UE support    BED MOBILITY:  Supine to Sit: Minimal Assistance. HOB slightly elevated, used bedrail and increased time required  TRANSFERS:  Sit to Stand: Contact Guard Assistance. EOB  Stand to Sit: Contact Guard Assistance. Bedside chair    AMBULATION:  Assistive Device: Rolling Walker  Assist Level:  Contact Guard Assistance. EOB to bedside chair      ADDITIONAL ACTIVITIES:  Attempted L hand [address] task of picking up paperclips with minimal success  Patient had small ball on tray table that he has been using for exercise.   Completed L hand grasp x 5 reps without thumb, patient completed L shoulder tableglides while using balls, tolerated well    ASSESSMENT:  Activity Tolerance:  Patient tolerance of  treatment: good. Discharge Recommendations: Continue to assess pending progress  Equipment Recommendations: Other: defer to next level of care  Plan: Times per week: 5x  Current Treatment Recommendations: Strengthening, Functional Mobility Training, Endurance Training, Balance Training, Safety Education & Training, Self-Care / ADL, Patient/Caregiver Education & Training    Patient Education  Patient Education: Plan of Care and Home Exercise Program    Goals  Short term goals  Time Frame for Short term goals: 2 weeks  Short term goal 1: Pt will complete functional mobility to/from BR with S to increase indep with accessing environment. Short term goal 2: Pt will complete dynamic standing task with S and B hand release x 5 min to increase balance required to complete grooming. Short term goal 3: Pt will complete LB ADL with SBA to increase indep with self care. Short term goal 4: Pt will complete L hand coordination and  strengthening tasks to increase use of L hand required for ADL tasks. Long term goals  Time Frame for Long term goals : No LTG d/t short ELOS.

## 2019-05-20 NOTE — CARE COORDINATION
5/20/19, 10:55 AM    DISCHARGE BARRIERS    Spoke with Margaret Oneil at [facility]-advised her of discharge for today. They are ready to accept. Staff will be contacting family to discuss which area of facility is appropriate for patient-rehab vs long term (will still receive therapy). 1:48 PM AVS faxed to facility. [facility] RN for report. Spoke with daughter Chip Klinefelter earlier and updated her on discharge plans. SW to notify her when transport has been scheduled. Patient will be skilled under his medicare benefit. No other needs at this time. 5/20/19, 1:51 PM    Discharge plan discussed by  and . Discharge plan reviewed with patient/ family. Patient/ family verbalize understanding of discharge plan and are in agreement with plan. Understanding was demonstrated using the teach back method.    Services After Discharge  Services At/After Discharge: Nursing Services, Skilled Therapy, Transport, In ambulance([facility])   IMM Letter  IMM Letter given to Patient/Family/Significant other/Guardian/POA/by[de-identified] care manager  IMM Letter date given[de-identified] 05/20/19  IMM Letter time given[de-identified] 1100

## 2019-05-20 NOTE — PROGRESS NOTES
Family called and updated about anticipated discharge time of 1600. Called report to MAYNOR Zamora at [facility]. Discharge forms faxed to [facility].

## 2019-05-20 NOTE — DISCHARGE SUMMARY
present, he did have brief slurred speech and the family members didn't think of a stroke until this happened. Family members revoke the hospice and brought him to the hospital for further evaluation    CT scan of the head did not show acute stroke or bleed and MRI could not be done because of the pacemaker. Overnight patient's symptoms especially speech was better and still has weakness in the left upper extremity. Had cough with eating and swallowing evaluation was done and is placed on dysphagia diet . Neurology was consulted and recommended Plavix for 3 weeks. Unfortunately difficult to determine if there was any embolic phenomenon because of patient not being able to get MRI. Did not do any aggressive workup especially 2-D echo or AVANI as he is not a candidate for anticoagulation patient was placed on Aricept for dementia and Remeron was discontinued. Because of his pain in the fingers from carpal tunnel syndrome low-dose Neurontin 100 mg was started and tolerated well and had mild improvement. .  Patient is currently being discharged to skilled nursing facility. It is still needing assist with activity      Exam:     Vitals:  Vitals:    05/19/19 0845 05/19/19 1538 05/19/19 2200 05/20/19 0828   BP: 118/67 (!) 130/58 119/62 128/65   Pulse: 103 81 81 73   Resp: 16 16 16 18   Temp: 97.7 °F (36.5 °C) 97.7 °F (36.5 °C) 98.3 °F (36.8 °C) 97.7 °F (36.5 °C)   TempSrc: Oral Oral Oral Oral   SpO2: 97% 97% 93% 94%   Weight:       Height:         Weight: Weight: 143 lb (64.9 kg)     24 hour intake/output:    Intake/Output Summary (Last 24 hours) at 5/20/2019 1140  Last data filed at 5/20/2019 7828  Gross per 24 hour   Intake 360 ml   Output 650 ml   Net -290 ml         Physical exam: please see progress notes        Labs:  For convenience and continuity at follow-up the following most recent labs are provided:      CBC:    Lab Results   Component Value Date    WBC 6.3 05/17/2019    HGB 11.0 05/17/2019    HCT 35.1 05/17/2019     05/17/2019       Renal:    Lab Results   Component Value Date     05/19/2019    K 4.1 05/19/2019    K 4.4 05/17/2019     05/19/2019    CO2 26 05/19/2019    BUN 30 05/19/2019    CREATININE 1.6 05/19/2019    CREATININE 1.6 02/07/2015    CALCIUM 8.9 05/19/2019    PHOS 2.8 01/11/2016         Significant Diagnostic Studies    Radiology:   CT HEAD WO CONTRAST (CODE STROKE)   Final Result   No acute intracranial findings. **This report has been created using voice recognition software. It may contain minor errors which are inherent in voice recognition technology. **      Final report electronically signed by Dr. Amber Rosenthal on 5/16/2019 3:05 PM             Consults:     IP CONSULT TO NEUROLOGY  PALLIATIVE CARE EVAL  IP CONSULT TO SOCIAL WORK    Disposition: SNF  Condition at Discharge: Stable    Code Status:  DNR-CC     Patient Instructions:    Discharge lab work: none  Activity: activity as tolerated  Diet: DIET DYSPHAGIA III ADVANCED; Follow-up visits:   Maryanna Severin, MD  [address] [place]  [phone]          [facility]  [facility]  [phone]             Discharge Medications:      [facility] Medication Instructions LGB:[id number]    Printed on:05/20/19 1140   Medication Information                      Acetaminophen (TYLENOL ARTHRITIS PAIN PO)  Take by mouth             aspirin EC 81 MG EC tablet  Take 1 tablet by mouth daily             clopidogrel (PLAVIX) 75 MG tablet  Take 1 tablet by mouth daily for 21 days             docusate sodium (COLACE) 250 MG capsule  Take 250 mg by mouth daily. donepezil (ARICEPT) 5 MG tablet  Take 1 tablet by mouth nightly             gabapentin (NEURONTIN) 100 MG capsule  Take 1 capsule by mouth nightly for 90 days.              isosorbide dinitrate (ISORDIL) 10 MG tablet  Take 1 tablet by mouth 3 times daily             metoprolol

## 2019-05-20 NOTE — PROGRESS NOTES
Physical Therapy  [facility]  PHYSICAL THERAPY MISSED TREATMENT NOTE  ACUTE CARE    Date: 2019  Patient Name: Leonard Chung        MRN: [id number]   : 1923  (80 y.o.)  Gender: male   Referring Practitioner: Sara Espinoza MD  Referring Practitioner: Dr. Gera Bender  Diagnosis: Stroke, Acute Embolic  Diagnosis: Stroke Acute Embolic          REASON FOR MISSED TREATMENT:  Attempted pt twice. Both attempts pt was sleeping and RN asked to allow pt to sleep. Will try back as time allows.

## 2019-05-20 NOTE — PROGRESS NOTES
Hospitalist Progress Note    Patient:  Ema Lewis      Unit/Bed:5K-25/025-A    YOB: 1923    MRN: [id number]       Acct: [de-identified]     PCP: Daquan Bai MD    Date of Admission: 5/16/2019    Assessment/Plan:    Acute right MCA stroke -presumed ischemic, cannot rule out if embolic   facial droop improved, left arm weakness improving, still has weakness , poor hand , cough while eating,  get PT and OT and SLP and neurology , get Lipid profile in am  Neurology consultation  Cannot do MRI because of a pacemaker  Continue aspirin, optimize blood pressure  5/18- appreciate neurology input, added Plavix for 3 weeks, continue PT and OT, and did okay with swallowing, on dysphagia 3 diet, blood pressure stable  5/19- still weak in left upper, little better overall  5/20- continue PT and OT and transfer to SNF    Dysphagia possibly secondary to stroke versus Alzheimer's: On dysphagia diet    Essential hypertension: Fairly well controlled continue beta blocker    Coronary artery disease status post bypass graft, without angina: Aspirin, statin, beta blocker    Chronic atrial fibrillation, status post pacemaker:  Currently not on Coumadin because of fall risk and multiple falls in the past  Continue metoprolol, rate controlled  -Continue aspirin and Plavix    Chronic kidney disease stage III: Creatinine close to baseline, hold her diuretic   Mild increase in creatinine 1.8, on IV fluids 1 L,    no retention  And improved to 1.6    Chronic diastolic congestive heart failure  : No obvious volume overload, continue metoprolol, Imdur    hold diuretics    Physical debility, continue PT and OT    COPD without exacerbation: Bronchodilators as needed    Carpal tunnel syndrome bilateral-complains of no numbness in both the hands especially in the fingers   try Neurontin 100 mg at night to see I it helps with neuropathy      Lumbar canal stenosis    Severe Alzheimer's disease without behavioral working on his left hand weakness, still has difficulty with hand , needing help with activities and also getting him up, didn't tolerate food, on dysphagia diet, appreciate neurology input, continue PT and OT, Plavix for 3 weeks, started on Aricept, check B12, subclinical hypothyroidism with elevated TSH and normal free T4, mild increase in creatinine to 1.8, on gentle hydration 1 L ordered by neurology, check bladder scan    5/19- pulled out IV access, creatinine 1.6 close to baseline, complaining of numbness and tingling in the fingers from carpal tunnel, try Neurontin 100 mg nightly and if tolerating gradually increase the dose, B12 level is good, still has weakness in the left upper extremity and needing one assist, possible discharge tomorrow    5 /20- very mild improvement in the tingling and numbness in the hands, continue low-dose Neurontin, increase the dose gradually if tolerates well without any change in mental status, discharged to skilled nursing facility to work on strength,  continue Plavix for 3 weeks. Will not give Lasix 40 mg daily.       Subjective (past 24 hours):    Pleasantly confused about dates otherwise able to maintain conversation, states that the tingling and numbness in the arm still there but little better, tolerating food and had a bowel movement, still has weakness in the left hand     Medications:  Reviewed    Infusion Medications     Scheduled Medications    gabapentin  100 mg Oral Nightly    clopidogrel  75 mg Oral Daily    donepezil  5 mg Oral Nightly    pravastatin  40 mg Oral Nightly    aspirin EC  81 mg Oral Daily    docusate sodium  250 mg Oral Daily    isosorbide dinitrate  10 mg Oral TID    metoprolol tartrate  12.5 mg Oral BID    sodium chloride flush  10 mL Intravenous 2 times per day    enoxaparin  30 mg Subcutaneous Q24H    famotidine  20 mg Oral Daily     PRN Meds: sodium chloride flush, magnesium hydroxide, ondansetron, acetaminophen      Intake/Output Summary (Last 24 hours) at 5/20/2019 1137  Last data filed at 5/20/2019 0297  Gross per 24 hour   Intake 360 ml   Output 650 ml   Net -290 ml       Diet:  DIET DYSPHAGIA III ADVANCED; Exam:  /65   Pulse 73   Temp 97.7 °F (36.5 °C) (Oral)   Resp 18   Ht 5' 6\" (1.676 m)   Wt 143 lb (64.9 kg)   SpO2 94%   BMI 23.08 kg/m²     Physical Examination:   General appearance - alert, awake  and in no distress at rest,   HEENT: Normocephalic, Atraumatic, pupils reactive, mucous membranes moist  Chest - moving equally to respiration,symmetric air entry, clear to auscultation, PPM +  Heart - normal rate, regular rhythm, normal S1, S2, no murmurs  Abdomen - soft, nontender, nondistended, no masses or organomegaly, BS+  Neurological - alert, oriented to name  And place- hospital but not to time, normal speech, sensations intact and able to move all extremities , except has weakness in the left upper arm, poor hand   Admits to numbness and tingling in the fingers  Extremities - peripheral pulses normal, no pedal edema,  Capillary refill less than 3 sec  Skin - normal coloration and turgor, no rashes        Labs:   No results for input(s): WBC, HGB, HCT, PLT in the last 72 hours. Recent Labs     05/18/19  0707 05/19/19  0700    141   K 4.0 4.1    104   CO2 26 26   BUN 34* 30*   CREATININE 1.8* 1.6*   CALCIUM 8.8 8.9     No results for input(s): AST, ALT, BILIDIR, BILITOT, ALKPHOS in the last 72 hours. No results for input(s): INR in the last 72 hours. No results for input(s): Ayad Pheasant in the last 72 hours.     Microbiology:      Urinalysis:      Lab Results   Component Value Date    NITRU Negative 10/02/2013    WBCUA 0-2 03/22/2013    BACTERIA NONE 03/22/2013    RBCUA 0-2 03/22/2013    BLOODU neg 04/08/2015    BLOODU Negative 10/02/2013    SPECGRAV 1.026 03/22/2013    GLUCOSEU neg 04/08/2015    GLUCOSEU neg 10/02/2013       Radiology:  CT HEAD WO CONTRAST (CODE STROKE)   Final Result   No acute intracranial findings. **This report has been created using voice recognition software. It may contain minor errors which are inherent in voice recognition technology. **      Final report electronically signed by Dr. Arley Wharton on 5/16/2019 3:05 PM          DVT prophylaxis: [x] Lovenox                                 [] SCDs                                 [] SQ Heparin                                 [] Encourage ambulation           [] Already on Anticoagulation     Code Status: DNR-CC    PT/OT Eval Status: ordered    Tele:   [x] yes             [] no    Active Hospital Problems    Diagnosis Date Noted    Chronic atrial fibrillation ([facility] [I48.2]     Weakness of left upper extremity [R29.898]     Oropharyngeal dysphagia [R13.12]     History of atrial fibrillation [Z86.79]     Acute right MCA stroke ([facility] [facility] [I63.511] 05/16/2019    Stage 3 chronic kidney disease ([facility] [address] [N18.3] 03/21/2013       Electronically signed by Js Vallecillo MD on 5/20/2019 at 11:37 AM

## 2019-05-20 NOTE — PROGRESS NOTES
[address] [address]  INPATIENT SPEECH THERAPY  [facility] 5K    TIME   SLP Individual Minutes  Time In: 3755  Time Out: 9834  Minutes: 608  Timed Code Treatment Minutes: 0 Minutes       [x]Daily Note  []Progress Note  []Discharge Note    Date: 2019  Patient Name: Tristian Mckinley        MRN: [id number]    : 1923  (89 y.o.)  Gender: male   Primary Provider: Clarisse Ruelas DO  Admitting Diagnosis:  Acute CVA  Secondary Diagnosis: dysphagia  Precautions: aspiration  Swallowing Status/Diet: Dysphagia III and thin  Swallowing Strategies: upright position, small bolus, pulmonary monitoring  DATE of last MBS:  BSE 19  Pain:  0/10    Subjective: Pt seen at bedside. Alert and cooperative. SHORT TERM GOAL #1:  Goal 1: Patient will tolerate dysphagia III diet with thin liquids without s/s of aspiration in order to safley maintain adequate hydration and nutrition  INTERVENTIONS: Trial naomi cracker and thin liquids by straw. Functional mastication and oral bolus formation with solids. No s/s of aspiration with solids or liquids. Recommend pt continue on Dysphagia III and thin diet. SHORT TERM GOAL #2:  Goal 2: Complete close pulmonary monitoring, if decline presents recommend instrumental evaluation. INTERVENTIONS: Stable pulmonary status with no acute concerns.        ASSESSMENT:  Assessment: [x]Progressing towards goals          []Not Progressing towards goals       Patient Tolerance of Treatment:   [x]Tolerated well []Tolerated fair []Required rest breaks []Fatigued  Plan for Next Session: monitor Dysphagia III solids  Education:  Learner:  [x]Patient          []Significant Other          []Other  Education provided regarding:  [x]Goals and POC   []Diet and swallowing precautions    []Home Exercise Program  []Progress and/or discharge information  Method of Education:  [x]Discussion          []Demonstration          []Handout          []Other  Evaluation of Education: []Verbalized understanding   []Demonstrates without assistance  []Demonstrates with assistance  [x]Needs further instruction     [x]No evidence of learning                  [x]No family present      Plan: [x]Continue with current plan of care    []Modify current plan of care as follows:    []Discharge patient    Discharge Location:    Services/Supervision Recommended:     [x]Patient continues to require treatment by a licensed therapist to address functional deficits as outlined in the established plan of care.     Jojo BALLARD-EDILBERTO/[id number]

## 2019-05-21 NOTE — TELEPHONE ENCOUNTER
Got a call from [facility] regarding the patient going into the Nursing Home at [facility] today  Patient will be a long term resident per [employer]

## 2019-05-23 NOTE — PROGRESS NOTES
Jessa Wang is a 80 y.o. male who presents today for his medical conditions/complaints as noted below. Chief Complaint   Patient presents with    Other     Admission visit           HPI:     Jessa Wang is a 80 y.o. male admitted to [facility] 5/20/19 from [facility]  Where he had been admitted 5/16/19 with upper extremity weakness, facial droop, and slurred speech      Patient was brought to the emergency room for further evaluation as patient had sudden onset of neck pain in the back in the afternoon while he was walking with drooling and slurred speech and wife had tough time understanding him.  Patient could not move his left arm.  Did not have any weakness in the lower extremity did not have any fall.  Patient was diagnosed with severe dementia and was transitioned to hospice 3 weeks ago by PCP and since patient was fairly independent with activities except feeding and bathing, he was at home. Apparently, on 5/11/19, while family members were present, he did have brief slurred speech and the family members didn't think of a stroke until this happened. Family members revoke the hospice and brought him to the hospital for further evaluation     CT scan of the head did not show acute stroke or bleed and MRI could not be done because of the pacemaker.  Overnight patient's symptoms especially speech was better and still has weakness in the left upper extremity.  Had cough with eating and swallowing evaluation was done and is placed on dysphagia diet .     Neurology was consulted and recommended Plavix for 3 weeks. Unfortunately difficult to determine if there was any embolic phenomenon because of patient not being able to get MRI. Did not do any aggressive workup especially 2-D echo or AVANI as he is not a candidate for anticoagulation patient was placed on Aricept for dementia and Remeron was discontinued.   Because of his pain in the fingers from carpal tunnel syndrome low-dose Neurontin 100 mg was started and tolerated well and had mild improvement. .  Patient was discharged to this  skilled nursing facility for therapies. He is still needing assist with activity. Patient has been sleeping well at bedtime. Bowels have been moving. He was able to tell me that tomorrow will be his 89th birthday. He is tolerating mechanical soft diet.     Past Medical History:   Diagnosis Date    Arthritis     [facility] fl[facility]oxysmal [facility]     [employer] dual pacer programmed VVIR chronic at fib  2012    BPH (benign prostatic hyperplasia)     CAD (coronary artery disease)     Status post bypass    Cancer ([facility] [address]     CHF (congestive heart failure) (HCC)     Chronic kidney disease     Dyslipidemia     Hyperlipidemia     Movement disorder     Sick sinus syndrome (HCC)     pace maker    Small bowel obstruction ([facility] [address]       Past Surgical History:   Procedure Laterality Date    CHOLECYSTECTOMY  11/15/2004    Dr Malin Points    COLONOSCOPY  2009    Dr Comer Pain    ENDOSCOPY, COLON, DIAGNOSTIC      EYE SURGERY      JOINT REPLACEMENT      both hips    PACEMAKER INSERTION      SKIN CANCER EXCISION      UPPER GASTROINTESTINAL ENDOSCOPY  10/05/2007    Dr Alexia Galvan       Family History   Problem Relation Age of Onset    Early Death Mother     Heart Disease Brother     Cancer Brother     Heart Disease Father     Cancer Sister     Arthritis Paternal Aunt     Other Paternal Aunt        Social History     Tobacco Use    Smoking status: Former Smoker     Types: Cigarettes     Last attempt to quit: 1993     Years since quittin.1    Smokeless tobacco: Former User   Substance Use Topics    Alcohol use: No      Current Outpatient Medications   Medication Sig Dispense Refill    metoprolol tartrate (LOPRESSOR) 25 MG tablet Take 0.5 tablets by mouth 2 times daily 30 tablet 5    donepezil (ARICEPT) 5 MG tablet Take 1 tablet by mouth nightly 30 tablet 3    clopidogrel (PLAVIX) 75 MG tablet Take 1 tablet by mouth daily for 21 days 21 tablet 0    gabapentin (NEURONTIN) 100 MG capsule Take 1 capsule by mouth nightly for 90 days. 90 capsule 3    pravastatin (PRAVACHOL) 40 MG tablet TAKE 1 TABLET BY MOUTH ONE TIME A DAY in the evening 90 tablet 1    isosorbide dinitrate (ISORDIL) 10 MG tablet Take 1 tablet by mouth 3 times daily 90 tablet 5    aspirin EC 81 MG EC tablet Take 1 tablet by mouth daily 30 tablet 3    Acetaminophen (TYLENOL ARTHRITIS PAIN PO) Take by mouth      senna (SENOKOT) 8.6 MG tablet Take 1 tablet by mouth nightly      docusate sodium (COLACE) 250 MG capsule Take 250 mg by mouth daily. No current facility-administered medications for this visit. Allergies   Allergen Reactions    Potassium Chloride Er Other (See Comments)     Chokes on 20 meq tablets       Health Maintenance   Topic Date Due    Pneumococcal 65+ years Vaccine (2 of 2 - PCV13) 01/11/2017    Flu vaccine  Completed       Subjective:      Review of Systems   Constitutional: Positive for activity change. Negative for chills, diaphoresis, fatigue and fever. HENT: Negative for congestion, facial swelling, mouth sores, rhinorrhea and sore throat. Eyes: Negative for redness and visual disturbance. Respiratory: Negative for cough, shortness of breath and wheezing. Cardiovascular: Negative for chest pain and leg swelling. Gastrointestinal: Negative for constipation, diarrhea, nausea and vomiting. Endocrine: Negative for heat intolerance, polydipsia, polyphagia and polyuria. Genitourinary: Negative for dysuria, frequency, genital sores, hematuria, penile pain and scrotal swelling. Musculoskeletal: Positive for gait problem. Negative for arthralgias and myalgias. Skin: Negative for pallor, rash and wound. Allergic/Immunologic: Negative for environmental allergies, food allergies and immunocompromised state.    Neurological: Negative for dizziness, tremors, light-headedness and headaches. Hematological: Does not bruise/bleed easily. Psychiatric/Behavioral: Negative for dysphoric mood and sleep disturbance. The patient is not nervous/anxious. Objective:     Physical Exam   Constitutional: He is oriented to person, place, and time. He appears well-developed and well-nourished. No distress. HENT:   Head: Normocephalic and atraumatic. Right Ear: External ear normal.   Left Ear: External ear normal.   Nose: Nose normal.   Mouth/Throat: Oropharynx is clear and moist and mucous membranes are normal.   Poor dentition   Eyes: Conjunctivae and EOM are normal. Right eye exhibits no discharge. Left eye exhibits no discharge. No scleral icterus. Neck: Neck supple. No JVD present. No tracheal deviation present. No thyromegaly present. Cardiovascular: Normal rate, regular rhythm, normal heart sounds and intact distal pulses. Exam reveals no gallop and no friction rub. Pulmonary/Chest: Effort normal and breath sounds normal. No respiratory distress. He has no wheezes. He has no rales. He exhibits no tenderness. Abdominal: Soft. Bowel sounds are normal. He exhibits no distension and no mass. There is no tenderness. There is no rebound. Musculoskeletal: Normal range of motion. He exhibits no edema or deformity. Right shoulder: He exhibits no tenderness, no bony tenderness and no pain. Left shoulder: He exhibits no tenderness, no bony tenderness and no pain. Cervical back: He exhibits no tenderness, no bony tenderness and no pain. Thoracic back: He exhibits no tenderness, no bony tenderness, no pain and no spasm. Lumbar back: He exhibits no tenderness, no bony tenderness and no pain. Lymphadenopathy:     He has no cervical adenopathy. Right: No supraclavicular adenopathy present. Left: No supraclavicular adenopathy present. Neurological: He is alert and oriented to person, place, and time. He displays abnormal reflex.  He displays no atrophy. He exhibits normal muscle tone. He displays no seizure activity. Skin: Skin is warm, dry and intact. No rash noted. No erythema. Psychiatric: He has a normal mood and affect. His speech is normal and behavior is normal. Cognition and memory are normal.   Nursing note and vitals reviewed. VITALS  /68   Pulse 54   Temp 98 °F (36.7 °C)   Resp 20   Ht 5' 3\" (1.6 m)   Wt 139 lb (63 kg)   SpO2 90%   BMI 24.62 kg/m²     Assessment/Plan:         Diagnosis Orders   1. Acute right MCA stroke ([address]     2. TIA (transient ischemic attack)     3. Late onset Alzheimer's disease with behavioral disturbance     4. Coronary artery disease involving native coronary artery of native heart without angina pectoris     5. Mood disturbance     6. Panlobular emphysema ([address]     7. At high risk for falls     8. CHF with left ventricular diastolic dysfunction, NYHA class 2 ([address]     9. Chronic kidney disease, stage III (moderate) ([facility])     10. Essential hypertension     11. Atrial flutter, paroxysmal ([address]     12. DVT of axillary vein, acute right ([address]     13. Primary osteoarthritis involving multiple joints     14. Limited mobility     15. S/P CABG x 5, 20 years ago       1. Debility secondary to recent TIA/acute right MCA CVA--continue therapies to improve mobility, ADL's, swallowing. Continue Plavix. 2.  Late Alzheimer's disease with behavioral disturbance--continue Aricept  3. CAD/status post CABG 20 years ago--chronic and stable; continue ASA  4. COPD/emphysema--chronic and stable; continue to monitor  5. CHF--chronic and stable; monitor weights/labs  6. CKD Stage 3--chronic and stable; monitor weights and labs  7. HTN--chronic and stable; continue Isosorbide Dinitrate, Metoprolol  8. Paraoxysmal A-flutter--chronic; continue ASA and Plavix; not good candidate for anticoagulation secondary to severe fall risk  9. OA--chronic and stable; continue Tylenol prn  10.  Hyperlipidemia--ochronic and stable;; continue Pravastatin  11. Constipation--chronic and stable; continue Colace and Senna  12. Dementia--chronic and stable; continue Ashley Corado M.D.                 Electronically signed by Nichole Nickerson MD on 5/23/2019 at 11:09 AM

## 2019-06-22 PROBLEM — G93.40 ENCEPHALOPATHY: Status: ACTIVE | Noted: 2019-01-01

## 2019-06-22 NOTE — ED TRIAGE NOTES
Pt to ED via EMS unresponsive since 0730 on 15L via non-rebreather. Pt has signed [facility], resident of [facility]. EMS rprts pt's last well known today at 0630 up to use the bathroom and back to bed. Nurse in to check on pt and found him unresponsive placed on O2 for comfort. Family called and asked to be transfer to ER. Dr. Ted Nunez at bedside to assess and speak w/family. Pt placed on monitor and in gown. EKG completed. No additional interventions per family's request. Pt repositioned on cot. Blankets applied. Lights dimmed. Hospice and spiritual care notified per [facility], charge.

## 2019-06-22 NOTE — H&P
History & Physical        Patient:  Jaclyn Junior  YOB: 1923    MRN: [id number]     Acct: [de-identified]    PCP: Lenora Damon MD    Date of Admission: 6/22/2019    Date of Service: Pt seen/examined on 06/22/19  and Admitted to Observation with expected LOS greater than two midnights due to medical therapy. Chief Complaint:  unresponsive      History Of Present Illness:    80 y.o. male who [facility]; [facility]; had a recent right MCA stroke; family states he was up to BR today, c/o feeling weak; he was placed in his recliner; staff went to check on him and found him to slumped over in the chair; he has not been responsive since; multiple family members at bedside; pt is a DNR CC; they are interested in speaking with hospice; wife at bedside holding his hand. Past Medical History:          Diagnosis Date    Arthritis     Atrial flutter, paroxysmal [facility])     [employer] dual pacer programmed VVIR chronic at fib  5/29/2012    BPH (benign prostatic hyperplasia)     CAD (coronary artery disease)     Status post bypass    Cancer ([facility]     CHF (congestive heart failure) (HCC)     Chronic kidney disease     Dyslipidemia     Hyperlipidemia     Movement disorder     Sick sinus syndrome (HCC)     pace maker    Small bowel obstruction ([address]        Past Surgical History:          Procedure Laterality Date    CHOLECYSTECTOMY  11/15/2004    Dr Shavonne Barraza    COLONOSCOPY  9/04/2009    Dr Blanca Herrera ENDOSCOPY, COLON, DIAGNOSTIC      EYE SURGERY      JOINT REPLACEMENT      both hips    PACEMAKER INSERTION  2012    SKIN CANCER EXCISION      UPPER GASTROINTESTINAL ENDOSCOPY  10/05/2007    Dr Sandra Silva       Medications Prior to Admission:      Prior to Admission medications    Medication Sig Start Date End Date Taking?  Authorizing Provider   donepezil (ARICEPT) 5 MG tablet Take 1 tablet by mouth nightly 5/20/19   Claudene Cowing, MD   clopidogrel (PLAVIX) 75 MG tablet Take 1 tablet by mouth daily for 21 days 5/21/19 6/11/19  Claudene Cowing, MD   gabapentin (NEURONTIN) 100 MG capsule Take 1 capsule by mouth nightly for 90 days. 5/20/19 8/18/19  Claudene Cowing, MD   pravastatin (PRAVACHOL) 40 MG tablet TAKE 1 TABLET BY MOUTH ONE TIME A DAY in the evening 3/27/19   Brian Hernandez MD   metoprolol tartrate (LOPRESSOR) 25 MG tablet Take 0.5 tablets by mouth 2 times daily 10/29/18   Brian Hernandez MD   isosorbide dinitrate (ISORDIL) 10 MG tablet Take 1 tablet by mouth 3 times daily 10/29/18   Brian Hernandez MD   aspirin EC 81 MG EC tablet Take 1 tablet by mouth daily 10/29/18   Brian Hernandez MD   Acetaminophen (TYLENOL ARTHRITIS PAIN PO) Take by mouth    Historical Provider, MD   senna (SENOKOT) 8.6 MG tablet Take 1 tablet by mouth nightly    Historical Provider, MD   docusate sodium (COLACE) 250 MG capsule Take 250 mg by mouth daily. Historical Provider, MD       Allergies:  Potassium chloride er    Social History:      The patient currently lives at [facility]. TOBACCO:   reports that he quit smoking about 26 years ago. His smoking use included cigarettes. He has quit using smokeless tobacco.  ETOH:   reports that he does not drink alcohol. Family History:      Positive as follows:        Problem Relation Age of Onset    Early Death Mother     Heart Disease Brother     Cancer Brother     Heart Disease Father     Cancer Sister     Arthritis Paternal Aunt     Other Paternal Aunt        REVIEW OF SYSTEMS:   Unable to obtain as pt is unresponsive    PHYSICAL EXAM:    /66   Pulse 90   Temp 96.9 °F (36.1 °C) (Axillary)   Resp 23   Wt 120 lb (54.4 kg)   SpO2 95%   BMI 21.26 kg/m²     General appearance:  Very ill but comfortable  HEENT:  Normal cephalic, atraumatic without obvious deformity. Pupils at 6 mm, fixed  Neck: Supple, with full range of motion.  No jugular venous on 6/22/2019 at 3:04 PM

## 2019-06-22 NOTE — FLOWSHEET NOTE
06/22/19 1400   Encounter Summary   Services provided to: Patient and family together   Referral/Consult From: Nurse; Hospice   Support System Spouse; Children;Family members   Continue Visiting Yes  (6/22 continue support)   Complexity of Encounter High   Length of Encounter 30 minutes   Grief and Life Adjustment   Type End of life; Hospice   Assessment Unable to respond   Intervention Active listening;Nurtured hope;Prayer   Outcome Coping     Pt was unresponsive. Family members were present. (spouse, children, grandchild). Family shared that pt was at [facility]. The staff found him unresponsive. Pt will be admitted with Hospice care. Family shared memories of family experiences. Pt and spouse has been  70+ years. Spiritual Care will continue to provide support to family/pt.

## 2019-06-22 NOTE — PROGRESS NOTES
Pt admitted to  5K11 from ED. Complaints: Unresponsive. IV none. Vital signs obtained. Assessment and data collection initiated. Oriented to room. All questions answered with no further questions at this time. Fall prevention and safety brochure discussed with patient. The best day to schedule a follow up Dr appointment is:  Tuesday a.mPriscila Phillips RN 6/22/2019 3:25 PM

## 2019-06-22 NOTE — ED NOTES
Bed: 002A  Expected date: 6/22/19  Expected time: 11:40 AM  Means of arrival: [facility]  Comments:     Taqueria Shepherd RN  06/22/19 [id number]

## 2019-06-23 NOTE — PROGRESS NOTES
Hospitalist Progress Note    Patient:  Andrew Angel      Unit/Bed:5K-11/011-A    YOB: 1923    MRN: [id number]       Acct: [de-identified]     PCP: Mena Martinez MD    Date of Admission: 6/22/2019    Assessment/Plan:  1) End of Life Care  - Continue with comfort measures  - Pt having increased secretions --> increase dose and added scopolomine    2) Recent Right MCA stroke    Disposition:    [] Home       [] TCU       [] Rehab       [] Psych       [] SNF       [] [place]       [x] Other-    Chief Complaint: F/U comfort care    Subjective (past 24 hours): Pt seen and examined bedside. Pt resting and is non verbal.  Secretions appreciated in upper airways. Medications:  Reviewed    Infusion Medications   Scheduled Medications   PRN Meds: bisacodyl, morphine **OR** morphine, glycopyrrolate, ondansetron, LORazepam    No intake or output data in the 24 hours ending 06/23/19 1150    Diet:  Diet NPO Effective Now    Exam:  BP (!) 129/54   Pulse 107   Temp 97.8 °F (36.6 °C) (Oral)   Resp 10   Wt 120 lb (54.4 kg)   SpO2 95%   BMI 21.26 kg/m²     General appearance: lethargic, non verbal  HEENT: Head atraumatic, normocephalic,   Respiratory:  Congestion noted in upper airways  Cardiovascular: Regular rate and rhythm with normal S1/S2 without murmurs, rubs or gallops. Abdomen: Soft, non-tender, non-distended with normal bowel sounds. Skin: Skin color, texture, turgor normal.  No rashes or lesions. Psychiatric: Lethargic  Capillary Refill: Brisk,< 3 seconds   Peripheral Pulses: +2 palpable, equal bilaterally       Labs:   Recent Labs     06/22/19  1223   WBC 6.0   HGB 11.2*   HCT 37.4*        Recent Labs     06/22/19  1223      K 4.1      CO2 30   BUN 23*   CREATININE 1.4*   CALCIUM 9.1     No results for input(s): AST, ALT, BILIDIR, BILITOT, ALKPHOS in the last 72 hours. No results for input(s): INR in the last 72 hours.   No results for input(s): CKTOTAL, TROPONINI in the last 72 hours.     Microbiology:      Urinalysis:      Lab Results   Component Value Date    NITRU Negative 10/02/2013    WBCUA 0-2 03/22/2013    BACTERIA NONE 03/22/2013    RBCUA 0-2 03/22/2013    BLOODU neg 04/08/2015    BLOODU Negative 10/02/2013    SPECGRAV 1.026 03/22/2013    GLUCOSEU neg 04/08/2015    GLUCOSEU neg 10/02/2013       Radiology:  No orders to display       DVT prophylaxis: [] Lovenox                                 [] SCDs                                 [] SQ Heparin                                 [] Encourage ambulation           [] Already on Anticoagulation     Code Status: DNR-CC    PT/OT Eval Status:     Tele:   [] yes             [] no    Active Hospital Problems    Diagnosis Date Noted    Encephalopathy [G93.40] 06/22/2019       Electronically signed by Agustina Alcazar MD on 6/23/2019 at 11:50 AM

## 2019-06-23 NOTE — PROGRESS NOTES
Besides regular positioning and oral care, patient did not require any medication throughout the night.

## 2019-06-23 NOTE — PROGRESS NOTES
Visit made to assess patient's comfort level. Many family members present in room when nurse arrived. Patient was in bed resting with eyes closed. Respirations easy and unlabored. Family states patient was having episodes of labored breathing and morphine dose had to be increased. Reviewed chart and noted that patient had received morphine 4mg x2 doses. Patient also getting robinul and ativan IV. Reviewed GIP level of care and criteria needed to qualify; family voiced understanding. Explained that hospice nurse Calvin Lyons on tomorrow and would be in to check on patient and evaluate again for inpatient status. Much emotional support given and 24 hr available restated. Patient's primary nurse Kathleen Espinosa updated and voiced understanding.

## 2019-06-23 NOTE — PROGRESS NOTES
Patient presenting with facial grimacing and moaning when turning. Morphine given. Brief 3 second period of apnea noted with shallow respirations. Currently RR 20.  Electronically signed by Olya Brady RN on 6/23/2019 at 10:19 AM

## 2019-06-23 NOTE — PROGRESS NOTES
Met with patient's spouse, 2 daughters, and other family members at patient's bedside. Patient was resting in bed with eyes closed; non-responsive. Respirations easy and unlabored. Appeared comfortable. Hospice concepts and philosophies discussed. Family states patient was on hospice for a few weeks earlier this year, however in May patient appeared to have a significant stroke and spouse revoked hospice and wanted patient transported to the hospital for evaluation. Since being released from the hospital, patient has been receiving therapy at [facility]. Daughter reports patient had only been participating in speech therapy, but was supposed to restart PT/OT today. PT/OT had been stopped due to patient's c/o pain with therapy. Educated family on different levels of hospice care, including GIP and hospice care in an ECF. Explained that patient not meeting criteria for GIP at this time, however hospice would re-evaluate patient tomorrow or sooner if condition worsened. Also explained that if patient would return to [facility] hospice benefit would be covered, however room and board would be out of pocket. Family voiced understanding. Updated patient's primary nurse Jyothi Palmer.

## 2019-06-24 NOTE — PROGRESS NOTES
[address] [place] [address]  Notice of Patient Passing      Patient Name- Evita Pelaez Number- [de-identified]   Attending Physician- Sam Weber MD    Admitted on-6/22/2019 11:55 AM     On 6/24/2019 at 0001 patient was found in [phone] with:   Absence of vital signs. Absence of neurological response. Confirmed time of death at 0001. Physician or On-call Physician notified of time of death- yes    Family present at time of death- yes,    Spiritual care present at time of death- yes,     Physician was notified and orders were obtained to release the body. Post-Mortem documentation completed; form printed, signed, and given to admitting.     Ricco Rodriguez RN Nursing Supervisor/ Manager  6/24/19   1:05 AM

## 2019-06-24 NOTE — DISCHARGE SUMMARY
Hospital Medicine Discharge Summary      Patient Identification:   Clair Kaminski   : 1923  MRN: [id number]   Account: [de-identified]      Patient's PCP: Valerie Butler MD    Admit Date: 2019     Discharge Date: 2019  Patient passed away at [address]    Admitting Physician: Vitor Ramirez MD     Discharge Physician: Vitor Ramirez MD     Discharge Diagnoses: Active Hospital Problems    Diagnosis Date Noted    Encephalopathy [G93.40] 2019       The patient was seen and examined on day of discharge and this discharge summary is in conjunction with any daily progress note from day of discharge. Hospital Course:   1) End of Life Care  - Pt was placed on comfort measures  - Pt passed away on 2019 at 00:01     2) Recent Right MCA stroke    Exam:     Vitals:  Vitals:    19 1316 19 1346 19 1417 19 0859   BP: 135/68 130/65 121/66 (!) 129/54   Pulse:    107   Resp:    10   Temp:    97.8 °F (36.6 °C)   TempSrc:    Oral   SpO2: 95% 94% 95% 95%   Weight:         Weight: Weight: 120 lb (54.4 kg)     24 hour intake/output:No intake or output data in the 24 hours ending 19 1434      Labs:  For convenience and continuity at follow-up the following most recent labs are provided:      CBC:    Lab Results   Component Value Date    WBC 6.0 2019    HGB 11.2 2019    HCT 37.4 2019     2019       Renal:    Lab Results   Component Value Date     2019    K 4.1 2019     2019    CO2 30 2019    BUN 23 2019    CREATININE 1.4 2019    CREATININE 1.6 2015    CALCIUM 9.1 2019    PHOS 2.8 2016     Significant Diagnostic Studies    Radiology:   No orders to display        Consults:     IP CONSULT TO HOSPICE    Condition at Discharge:     Code Status:  Prior     Patient Instructions:    Rest In Peace    Discharge Medications: None    Time Spent on discharge is more than 20 minutes in the examination, evaluation, counseling and review of medications and discharge plan. Signed: Thank you Andres Ganser, MD for the opportunity to be involved in this patient's care.     Electronically signed by Manjit Brewer MD on 6/24/2019 at 2:34 PM

## 2019-06-24 NOTE — FLOWSHEET NOTE
The patient passed and the attending nurse called and requested this  be available to the spouse and three adult daughters. The patient's spouse is 80 yrs young and they had been  for 77 years. After conversing with the family about the life of the patient a prayer of comfort was offered for the family and a prayer of release into the hands of God for the patient. 06/24/19 0015   Encounter Summary   Services provided to: Family   Referral/Consult From: Nursing Supervisor/Manager   Support System Spouse; Children   Continue Visiting Yes  (6/23)   Complexity of Encounter High   Length of Encounter 45 minutes   Grief and Life Adjustment   Type Death   Assessment Tearful;Grieving;Coping;Peaceful   Intervention Prayer; Active listening;Sustaining presence/ Ministry of presence; Discussed death   Outcome Connection/belonging;Expressed gratitude; Tearful

## 2019-06-24 NOTE — PROGRESS NOTES
No signs of life at midnight rounding. Patient passed 0001 6/24/19. Family at bedside. Hospice, house supervisor, and doctor notified.  coming when he can.

## 2022-11-03 NOTE — ED PROVIDER NOTES
LV requesting patient to call if he has questions about sleep study result, a prefered DME or to schedule a compliance follow up . Sending orders to [employer] unless patient requests otherwise    [facility]  eMERGENCY dEPARTMENT eNCOUnter          [address] [facility]     No chief complaint on file. Nurses Notes reviewed and I agree except as noted in the HPI. HISTORY OF PRESENT ILLNESS    Sonny Eubanks is a 80 y.o. male with a past medical history of atrial flutter, arthritis, BPH, CHF, sick sinus syndrome, CVA, and CKD. The patient presents to the ED via EMS from [facility] for evaluation of AMS. Per nursing home and family report, the patient's last known well was this morning around 09:15 after he was put back to bed after breakfast. He was reportedly normal before then with normal mentation and interactions. Patient has been unresponsive since. He is a DNR-CC. REVIEW OFSYSTEMS     Review of Systems   Unable to perform ROS: Patient unresponsive       PAST MEDICAL HISTORY    has a past medical history of Arthritis, Atrial flutter, paroxysmal ([address] [employer] dual pacer programmed VVIR chronic at fib , BPH (benign prostatic hyperplasia), CAD (coronary artery disease), Cancer ([address] CHF (congestive heart failure) ([address] Chronic kidney disease, Dyslipidemia, Hyperlipidemia, Movement disorder, Sick sinus syndrome ([address] and Small bowel obstruction ([address] SURGICAL HISTORY      has a past surgical history that includes Cholecystectomy (11/15/2004); Upper gastrointestinal endoscopy (10/05/2007); Colonoscopy (9/04/2009); Coronary artery bypass graft (2000); Pacemaker insertion (2012); Skin cancer excision; Endoscopy, colon, diagnostic; joint replacement; and eye surgery.     [address]       Current Discharge Medication List      CONTINUE these medications which have NOT CHANGED    Details   donepezil (ARICEPT) 5 MG tablet Take 1 tablet by mouth nightly  Qty: 30 tablet, Refills: 3      clopidogrel (PLAVIX) 75 MG tablet Take 1 tablet by mouth daily for 21 days  Qty: 21 tablet, Refills: 0      gabapentin (NEURONTIN) 100 MG capsule Take 1 capsule by mouth nightly for 90 days. Qty: 90 capsule, Refills: 3      pravastatin (PRAVACHOL) 40 MG tablet TAKE 1 TABLET BY MOUTH ONE TIME A DAY in the evening  Qty: 90 tablet, Refills: 1    Associated Diagnoses: Coronary artery disease involving native coronary artery of native heart without angina pectoris      metoprolol tartrate (LOPRESSOR) 25 MG tablet Take 0.5 tablets by mouth 2 times daily  Qty: 30 tablet, Refills: 5    Associated Diagnoses: Coronary artery disease involving native coronary artery of native heart without angina pectoris      isosorbide dinitrate (ISORDIL) 10 MG tablet Take 1 tablet by mouth 3 times daily  Qty: 90 tablet, Refills: 5    Associated Diagnoses: Coronary artery disease involving native coronary artery of native heart without angina pectoris      aspirin EC 81 MG EC tablet Take 1 tablet by mouth daily  Qty: 30 tablet, Refills: 3    Associated Diagnoses: Coronary artery disease involving native coronary artery of native heart without angina pectoris      Acetaminophen (TYLENOL ARTHRITIS PAIN PO) Take by mouth      senna (SENOKOT) 8.6 MG tablet Take 1 tablet by mouth nightly      docusate sodium (COLACE) 250 MG capsule Take 250 mg by mouth daily. ALLERGIES   is allergic to potassium chloride er. FAMILY HISTORY     indicated that his mother is . He indicated that his father is . He indicated that the status of his sister is unknown. He indicated that his brother is . He indicated that the status of his paternal aunt is unknown.   family history includes Arthritis in his paternal aunt; Cancer in his brother and sister; Early Death in his mother; Heart Disease in his brother and father; Other in his paternal aunt. SOCIAL HISTORY      reports that he quit smoking about 26 years ago. His smoking use included cigarettes. He has quit using smokeless tobacco. He reports that he does not drink alcohol or use drugs.     PHYSICAL EXAM     INITIAL VITALS:  weight is 135/68 130/65 121/66   Pulse:       Resp:       Temp:       TempSrc:       SpO2: 94% 95% 94% 95%   Weight:           12:06 PM: The patient was seen and evaluated. Family offered option to have limited workup completed, including labs, family did not want any other testing completed. MDM:  Patient was seen and evaluated in the ED for unresponsiveness. Patient was placed on a cardiac monitor on his arrival in the ED. EKG without ischemic changes. Patient had a GCS of 8. Labs completed. Family did not want any other testing as the patient is a DNR-CC. Labs were unremarkable. hospitalist service was consulted and will admit the patient for hospice eavaluation. CRITICAL CARE:   None      CONSULTS:  Filemon Vanegas CNP (Hospitalist)    PROCEDURES:  None      FINAL IMPRESSION      1. Somnolence    2. Unresponsive state          DISPOSITION/PLAN   Admission     PATIENT REFERRED TO:  Monae Daigle MD  [address] [place]          Fiordaliza Evans MD  [address] [place] [address]  [phone]            DISCHARGE MEDICATIONS:  Current Discharge Medication List          (Please note that portionsof this note were completed with a voice recognition program.  Efforts were made to edit the dictations but occasionally words are mis-transcribed.)    Scribe: Neil Santiago 6/22/19 12:06 PM Scribing for and in the presence of Jose Eduardo Husain DO. Signed by: Kedar Nieves,06/23/19 7:34 AM    Provider:  I personally performed the services described in the documentation,reviewed and edited the documentation which wasdictated to the scribe in my presence, and it accurately records my words and actions.     Jose Eduardo Husain DO. 6/22/19 7:34 AM            Jose Eduardo Husain DO  06/23/19 8240